# Patient Record
Sex: FEMALE | Race: WHITE | Employment: FULL TIME | URBAN - METROPOLITAN AREA
[De-identification: names, ages, dates, MRNs, and addresses within clinical notes are randomized per-mention and may not be internally consistent; named-entity substitution may affect disease eponyms.]

---

## 2017-03-13 ENCOUNTER — OFFICE VISIT (OUTPATIENT)
Dept: FAMILY MEDICINE CLINIC | Facility: CLINIC | Age: 62
End: 2017-03-13

## 2017-03-13 VITALS
BODY MASS INDEX: 29.07 KG/M2 | DIASTOLIC BLOOD PRESSURE: 68 MMHG | TEMPERATURE: 98 F | WEIGHT: 194 LBS | OXYGEN SATURATION: 98 % | HEIGHT: 68.5 IN | HEART RATE: 78 BPM | RESPIRATION RATE: 20 BRPM | SYSTOLIC BLOOD PRESSURE: 118 MMHG

## 2017-03-13 DIAGNOSIS — Z00.00 LABORATORY EXAMINATION ORDERED AS PART OF A ROUTINE GENERAL MEDICAL EXAMINATION: Primary | ICD-10-CM

## 2017-03-13 DIAGNOSIS — Z12.4 SCREENING FOR CERVICAL CANCER: ICD-10-CM

## 2017-03-13 DIAGNOSIS — Z01.419 ENCOUNTER FOR WELL WOMAN EXAM WITH ROUTINE GYNECOLOGICAL EXAM: ICD-10-CM

## 2017-03-13 PROCEDURE — 88175 CYTOPATH C/V AUTO FLUID REDO: CPT | Performed by: FAMILY MEDICINE

## 2017-03-13 PROCEDURE — 87624 HPV HI-RISK TYP POOLED RSLT: CPT | Performed by: FAMILY MEDICINE

## 2017-03-13 PROCEDURE — 99396 PREV VISIT EST AGE 40-64: CPT | Performed by: FAMILY MEDICINE

## 2017-03-13 NOTE — PROGRESS NOTES
Here for well woman exam with breast exam and Pap. Has no complaints. Has been nursing her  to health after a very complicated hip replacement. Otherwise review of systems is unremarkable. She is heading back to the exercise gym.   She is on no o

## 2017-03-14 ENCOUNTER — TELEPHONE (OUTPATIENT)
Dept: FAMILY MEDICINE CLINIC | Facility: CLINIC | Age: 62
End: 2017-03-14

## 2017-03-14 NOTE — TELEPHONE ENCOUNTER
Pt states she is looking for her meds from yesterdays visit to go to [address] They do not have them yet. Can we resend them to the pharmacy again? Call her back if any problems. She was seen yesterday.

## 2017-03-14 NOTE — TELEPHONE ENCOUNTER
Pt states she is looking for her meds from yesterdays visit to go to [place] [address]. They do not have them yet. Can we resend them to the pharmacy again? Do you want Estrace cream .01%, 1gm topically 2-3 times a week?

## 2017-03-16 LAB — HPV I/H RISK 1 DNA SPEC QL NAA+PROBE: NEGATIVE

## 2017-03-17 ENCOUNTER — LAB ENCOUNTER (OUTPATIENT)
Dept: LAB | Age: 62
End: 2017-03-17
Attending: FAMILY MEDICINE
Payer: COMMERCIAL

## 2017-03-17 DIAGNOSIS — Z00.00 LABORATORY EXAMINATION ORDERED AS PART OF A ROUTINE GENERAL MEDICAL EXAMINATION: ICD-10-CM

## 2017-03-17 LAB
25-HYDROXYVITAMIN D (TOTAL): 44.5 NG/ML (ref 30–100)
ALBUMIN SERPL-MCNC: 3.5 G/DL (ref 3.5–4.8)
ALP LIVER SERPL-CCNC: 98 U/L (ref 50–130)
ALT SERPL-CCNC: 25 U/L (ref 14–54)
AST SERPL-CCNC: 17 U/L (ref 15–41)
BASOPHILS # BLD AUTO: 0.04 X10(3) UL (ref 0–0.1)
BASOPHILS NFR BLD AUTO: 0.6 %
BILIRUB SERPL-MCNC: 0.5 MG/DL (ref 0.1–2)
BUN BLD-MCNC: 16 MG/DL (ref 8–20)
CALCIUM BLD-MCNC: 9.5 MG/DL (ref 8.3–10.3)
CHLORIDE: 109 MMOL/L (ref 101–111)
CHOLEST SMN-MCNC: 199 MG/DL (ref ?–200)
CO2: 27 MMOL/L (ref 22–32)
CREAT BLD-MCNC: 0.84 MG/DL (ref 0.55–1.02)
EOSINOPHIL # BLD AUTO: 0.12 X10(3) UL (ref 0–0.3)
EOSINOPHIL NFR BLD AUTO: 1.9 %
ERYTHROCYTE [DISTWIDTH] IN BLOOD BY AUTOMATED COUNT: 13.3 % (ref 11.5–16)
GLUCOSE BLD-MCNC: 84 MG/DL (ref 70–99)
HCT VFR BLD AUTO: 40.6 % (ref 34–50)
HDLC SERPL-MCNC: 92 MG/DL (ref 45–?)
HDLC SERPL: 2.16 {RATIO} (ref ?–4.44)
HGB BLD-MCNC: 13.4 G/DL (ref 12–16)
IMMATURE GRANULOCYTE COUNT: 0.01 X10(3) UL (ref 0–1)
IMMATURE GRANULOCYTE RATIO %: 0.2 %
LDLC SERPL CALC-MCNC: 98 MG/DL (ref ?–130)
LYMPHOCYTES # BLD AUTO: 2.94 X10(3) UL (ref 0.9–4)
LYMPHOCYTES NFR BLD AUTO: 47 %
M PROTEIN MFR SERPL ELPH: 6.8 G/DL (ref 6.1–8.3)
MCH RBC QN AUTO: 33 PG (ref 27–33.2)
MCHC RBC AUTO-ENTMCNC: 33 G/DL (ref 31–37)
MCV RBC AUTO: 100 FL (ref 81–100)
MONOCYTES # BLD AUTO: 0.56 X10(3) UL (ref 0.1–0.6)
MONOCYTES NFR BLD AUTO: 9 %
NEUTROPHIL ABS PRELIM: 2.58 X10 (3) UL (ref 1.3–6.7)
NEUTROPHILS # BLD AUTO: 2.58 X10(3) UL (ref 1.3–6.7)
NEUTROPHILS NFR BLD AUTO: 41.3 %
NONHDLC SERPL-MCNC: 107 MG/DL (ref ?–130)
PLATELET # BLD AUTO: 259 10(3)UL (ref 150–450)
POTASSIUM SERPL-SCNC: 4.1 MMOL/L (ref 3.6–5.1)
RBC # BLD AUTO: 4.06 X10(6)UL (ref 3.8–5.1)
RED CELL DISTRIBUTION WIDTH-SD: 49.3 FL (ref 35.1–46.3)
SODIUM SERPL-SCNC: 143 MMOL/L (ref 136–144)
TRIGLYCERIDES: 46 MG/DL (ref ?–150)
TSI SER-ACNC: 6.2 MIU/ML (ref 0.35–5.5)
VLDL: 9 MG/DL (ref 5–40)
WBC # BLD AUTO: 6.3 X10(3) UL (ref 4–13)

## 2017-03-17 PROCEDURE — 80053 COMPREHEN METABOLIC PANEL: CPT

## 2017-03-17 PROCEDURE — 80061 LIPID PANEL: CPT

## 2017-03-17 PROCEDURE — 36415 COLL VENOUS BLD VENIPUNCTURE: CPT

## 2017-03-17 PROCEDURE — 84443 ASSAY THYROID STIM HORMONE: CPT

## 2017-03-17 PROCEDURE — 82306 VITAMIN D 25 HYDROXY: CPT

## 2017-03-17 PROCEDURE — 85025 COMPLETE CBC W/AUTO DIFF WBC: CPT

## 2017-03-17 RX ORDER — ESTRADIOL 0.1 MG/G
CREAM VAGINAL
Qty: 42.5 G | Refills: 0 | Status: SHIPPED | OUTPATIENT
Start: 2017-03-17 | End: 2018-10-09

## 2017-03-20 DIAGNOSIS — R79.89 ELEVATED TSH: Primary | ICD-10-CM

## 2017-03-22 ENCOUNTER — TELEPHONE (OUTPATIENT)
Dept: FAMILY MEDICINE CLINIC | Facility: CLINIC | Age: 62
End: 2017-03-22

## 2017-07-26 ENCOUNTER — APPOINTMENT (OUTPATIENT)
Dept: LAB | Age: 62
End: 2017-07-26
Attending: FAMILY MEDICINE
Payer: COMMERCIAL

## 2017-07-26 DIAGNOSIS — R79.89 ELEVATED TSH: ICD-10-CM

## 2017-07-26 LAB
FREE T4: 0.9 NG/DL (ref 0.9–1.8)
TSI SER-ACNC: 7.4 MIU/ML (ref 0.35–5.5)

## 2017-07-26 PROCEDURE — 36415 COLL VENOUS BLD VENIPUNCTURE: CPT

## 2017-07-26 PROCEDURE — 84439 ASSAY OF FREE THYROXINE: CPT

## 2017-07-26 PROCEDURE — 84443 ASSAY THYROID STIM HORMONE: CPT

## 2017-08-29 ENCOUNTER — OFFICE VISIT (OUTPATIENT)
Dept: FAMILY MEDICINE CLINIC | Facility: CLINIC | Age: 62
End: 2017-08-29

## 2017-08-29 VITALS
DIASTOLIC BLOOD PRESSURE: 68 MMHG | HEART RATE: 66 BPM | WEIGHT: 194 LBS | BODY MASS INDEX: 29 KG/M2 | SYSTOLIC BLOOD PRESSURE: 104 MMHG | RESPIRATION RATE: 16 BRPM | TEMPERATURE: 98 F

## 2017-08-29 DIAGNOSIS — E03.8 SUBCLINICAL HYPOTHYROIDISM: Primary | ICD-10-CM

## 2017-08-29 PROCEDURE — 99213 OFFICE O/P EST LOW 20 MIN: CPT | Performed by: FAMILY MEDICINE

## 2017-08-29 RX ORDER — LEVOTHYROXINE SODIUM 0.03 MG/1
25 TABLET ORAL
Qty: 90 TABLET | Refills: 3 | Status: SHIPPED | OUTPATIENT
Start: 2017-08-29 | End: 2017-11-22 | Stop reason: DRUGHIGH

## 2017-08-29 NOTE — PROGRESS NOTES
Long-standing history of gradually increasing TSH with mild symptoms such as brittle nails and easy fatigability. Denies palpitations depression chest pain or exercise intolerance. She is a non-smoker and modest drinker.   Otherwise she is very healthy

## 2017-10-03 ENCOUNTER — APPOINTMENT (OUTPATIENT)
Dept: LAB | Age: 62
End: 2017-10-03
Attending: FAMILY MEDICINE
Payer: COMMERCIAL

## 2017-10-03 DIAGNOSIS — E03.8 SUBCLINICAL HYPOTHYROIDISM: ICD-10-CM

## 2017-10-03 PROCEDURE — 36415 COLL VENOUS BLD VENIPUNCTURE: CPT

## 2017-10-03 PROCEDURE — 84443 ASSAY THYROID STIM HORMONE: CPT

## 2017-10-04 ENCOUNTER — TELEPHONE (OUTPATIENT)
Dept: FAMILY MEDICINE CLINIC | Facility: CLINIC | Age: 62
End: 2017-10-04

## 2017-10-04 DIAGNOSIS — Z23 NEED FOR TDAP VACCINATION: Primary | ICD-10-CM

## 2017-10-09 ENCOUNTER — APPOINTMENT (OUTPATIENT)
Dept: ULTRASOUND IMAGING | Age: 62
End: 2017-10-09
Attending: PHYSICIAN ASSISTANT
Payer: COMMERCIAL

## 2017-10-09 ENCOUNTER — HOSPITAL ENCOUNTER (OUTPATIENT)
Age: 62
Discharge: HOME | End: 2017-10-09
Payer: COMMERCIAL

## 2017-10-09 ENCOUNTER — APPOINTMENT (OUTPATIENT)
Dept: GENERAL RADIOLOGY | Age: 62
End: 2017-10-09
Attending: PHYSICIAN ASSISTANT
Payer: COMMERCIAL

## 2017-10-09 VITALS
SYSTOLIC BLOOD PRESSURE: 116 MMHG | TEMPERATURE: 98 F | HEART RATE: 72 BPM | BODY MASS INDEX: 27.4 KG/M2 | OXYGEN SATURATION: 100 % | DIASTOLIC BLOOD PRESSURE: 65 MMHG | WEIGHT: 185 LBS | HEIGHT: 69 IN | RESPIRATION RATE: 20 BRPM

## 2017-10-09 DIAGNOSIS — M17.12 TRICOMPARTMENT OSTEOARTHRITIS OF LEFT KNEE: ICD-10-CM

## 2017-10-09 DIAGNOSIS — M25.562 ACUTE PAIN OF LEFT KNEE: Primary | ICD-10-CM

## 2017-10-09 PROCEDURE — 99214 OFFICE O/P EST MOD 30 MIN: CPT

## 2017-10-09 PROCEDURE — 99204 OFFICE O/P NEW MOD 45 MIN: CPT

## 2017-10-09 PROCEDURE — 73560 X-RAY EXAM OF KNEE 1 OR 2: CPT | Performed by: PHYSICIAN ASSISTANT

## 2017-10-09 PROCEDURE — 76881 US COMPL JOINT R-T W/IMG: CPT | Performed by: PHYSICIAN ASSISTANT

## 2017-10-09 RX ORDER — NAPROXEN 500 MG/1
500 TABLET ORAL 2 TIMES DAILY PRN
Qty: 30 TABLET | Refills: 0 | Status: SHIPPED | OUTPATIENT
Start: 2017-10-09 | End: 2017-10-16

## 2017-10-09 NOTE — ED PROVIDER NOTES
Patient Seen in: [facility]    History   Patient presents with:  Knee Pain    Stated Complaint: Left knee pain     HPI    Cherry Hernandez is a 78-year-old female who comes in today complaining of persistent left knee pain over the past Cardiovascular: Normal rate, regular rhythm, normal heart sounds and intact distal pulses. Pulmonary/Chest: Effort normal and breath sounds normal. No respiratory distress. She has no wheezes. She has no rales. Musculoskeletal:        Left knee:  She Kelli Lowery MD on 10/09/2017 at 10:50     Approved by: Ines Mahoney MD            Us Knee Left Complete (HMS=63014)    Result Date: 10/9/2017  PROCEDURE:  US KNEE LEFT COMPLETE (CPT=76881)  COMPARISON:  SARAH LYONS KNEE (1 OR 2 VIEWS), LEFT (CPT=73560) knee      Medications Prescribed:  Current Discharge Medication List    START taking these medications    naproxen 500 MG Oral Tab  Take 1 tablet (500 mg total) by mouth 2 (two) times daily as needed.   Qty: 30 tablet Refills: 0           I have given the p Authored by Resident/PA/NP

## 2017-10-09 NOTE — ED INITIAL ASSESSMENT (HPI)
Left knee pain- pain x 1 month, yesterday she states she twisted her knee. C/o pain on the back of knee. Pain constant .  Took aleve at 6 am today

## 2017-10-10 ENCOUNTER — TELEPHONE (OUTPATIENT)
Dept: FAMILY MEDICINE CLINIC | Facility: CLINIC | Age: 62
End: 2017-10-10

## 2017-10-10 ENCOUNTER — NURSE ONLY (OUTPATIENT)
Dept: FAMILY MEDICINE CLINIC | Facility: CLINIC | Age: 62
End: 2017-10-10

## 2017-10-10 ENCOUNTER — MED REC SCAN ONLY (OUTPATIENT)
Dept: FAMILY MEDICINE CLINIC | Facility: CLINIC | Age: 62
End: 2017-10-10

## 2017-10-10 DIAGNOSIS — Z23 NEED FOR VACCINATION: ICD-10-CM

## 2017-10-10 PROCEDURE — 90472 IMMUNIZATION ADMIN EACH ADD: CPT | Performed by: FAMILY MEDICINE

## 2017-10-10 PROCEDURE — 90471 IMMUNIZATION ADMIN: CPT | Performed by: FAMILY MEDICINE

## 2017-10-10 PROCEDURE — 90715 TDAP VACCINE 7 YRS/> IM: CPT | Performed by: FAMILY MEDICINE

## 2017-10-10 PROCEDURE — 90686 IIV4 VACC NO PRSV 0.5 ML IM: CPT | Performed by: FAMILY MEDICINE

## 2017-10-10 NOTE — TELEPHONE ENCOUNTER
Patient stopped at , she dropped off copies of her urgent care report. She is being referred to ortho for her knee injury and she would prefer the recommendation come from Dr Lela Lawson of which ortho he would prefer.     Please call    Forms in tr

## 2017-11-22 PROBLEM — M89.9 LYTIC BONE LESION OF LEFT FEMUR: Status: ACTIVE | Noted: 2017-11-22

## 2017-11-22 PROBLEM — M89.8X5 LYTIC BONE LESION OF LEFT FEMUR: Status: ACTIVE | Noted: 2017-11-22

## 2017-11-22 PROBLEM — M17.0 PRIMARY OSTEOARTHRITIS OF BOTH KNEES: Status: ACTIVE | Noted: 2017-11-22

## 2017-11-29 ENCOUNTER — HOSPITAL ENCOUNTER (OUTPATIENT)
Dept: MRI IMAGING | Age: 62
Discharge: HOME | End: 2017-11-29
Attending: ORTHOPAEDIC SURGERY
Payer: COMMERCIAL

## 2017-11-29 DIAGNOSIS — M17.0 PRIMARY OSTEOARTHRITIS OF KNEES, BILATERAL: ICD-10-CM

## 2017-11-29 DIAGNOSIS — M89.9 LYTIC BONE LESION OF LEFT FEMUR: ICD-10-CM

## 2017-11-29 PROCEDURE — 73721 MRI JNT OF LWR EXTRE W/O DYE: CPT | Performed by: ORTHOPAEDIC SURGERY

## 2018-01-02 ENCOUNTER — APPOINTMENT (OUTPATIENT)
Dept: LAB | Age: 63
End: 2018-01-02
Attending: FAMILY MEDICINE
Payer: COMMERCIAL

## 2018-01-02 DIAGNOSIS — E03.8 SUBCLINICAL HYPOTHYROIDISM: ICD-10-CM

## 2018-01-02 LAB — TSI SER-ACNC: 4.29 MIU/ML (ref 0.35–5.5)

## 2018-01-02 PROCEDURE — 84443 ASSAY THYROID STIM HORMONE: CPT

## 2018-01-02 PROCEDURE — 36415 COLL VENOUS BLD VENIPUNCTURE: CPT

## 2018-01-05 ENCOUNTER — TELEPHONE (OUTPATIENT)
Dept: FAMILY MEDICINE CLINIC | Facility: CLINIC | Age: 63
End: 2018-01-05

## 2018-01-05 NOTE — TELEPHONE ENCOUNTER
Pt called back returning a call for the nurse to discuss her test results. Please call pt and advise.

## 2018-01-08 DIAGNOSIS — E03.8 SUBCLINICAL HYPOTHYROIDISM: ICD-10-CM

## 2018-01-09 RX ORDER — LEVOTHYROXINE SODIUM 0.05 MG/1
TABLET ORAL
Qty: 90 TABLET | Refills: 0 | Status: SHIPPED | OUTPATIENT
Start: 2018-01-09 | End: 2018-04-02

## 2018-03-02 ENCOUNTER — TELEPHONE (OUTPATIENT)
Dept: FAMILY MEDICINE CLINIC | Facility: CLINIC | Age: 63
End: 2018-03-02

## 2018-03-02 DIAGNOSIS — M89.8X5 OTHER SPECIFIED DISORDERS OF BONE, THIGH: Primary | ICD-10-CM

## 2018-03-14 PROBLEM — S83.232A COMPLEX TEAR OF MEDIAL MENISCUS OF LEFT KNEE AS CURRENT INJURY, INITIAL ENCOUNTER: Status: ACTIVE | Noted: 2018-03-14

## 2018-03-14 PROBLEM — M17.12 PRIMARY OSTEOARTHRITIS OF LEFT KNEE: Status: ACTIVE | Noted: 2018-03-14

## 2018-04-02 DIAGNOSIS — E03.8 SUBCLINICAL HYPOTHYROIDISM: ICD-10-CM

## 2018-04-02 RX ORDER — LEVOTHYROXINE SODIUM 0.05 MG/1
TABLET ORAL
Qty: 90 TABLET | Refills: 0 | Status: SHIPPED | OUTPATIENT
Start: 2018-04-02 | End: 2018-06-21

## 2018-04-19 ENCOUNTER — PATIENT MESSAGE (OUTPATIENT)
Dept: FAMILY MEDICINE CLINIC | Facility: CLINIC | Age: 63
End: 2018-04-19

## 2018-04-19 DIAGNOSIS — D58.2 HEMOGLOBIN DISORDER (HCC): Primary | ICD-10-CM

## 2018-04-19 NOTE — TELEPHONE ENCOUNTER
From: Rosy Jaramillo  To: Елена Dumont DO  Sent: 4/19/2018 12:26 PM CDT  Subject: Non-Urgent Medical Question    Thanks - we will meet. Does it really require Dr. Fiona Saba (oncologist) in that it is FACTOR 2 (not factor 5).     Can't we just get regul

## 2018-04-20 NOTE — TELEPHONE ENCOUNTER
The serologic studies usually go beyond routine testing.  That' s where lily comes       Or    Can forward your other daughter's test results to me and I'll run it by lily, saving you  An appointment  umang

## 2018-05-17 NOTE — TELEPHONE ENCOUNTER
Per patient, patient would like ot know how to get testing to find out if she is the carrier of the prothombin mutation her daughter has. Per Dr. Lionel Cintron, pt to see Dr. Gaby Chin to discuss. Referral placed.

## 2018-06-16 ENCOUNTER — OFFICE VISIT (OUTPATIENT)
Dept: FAMILY MEDICINE CLINIC | Facility: CLINIC | Age: 63
End: 2018-06-16

## 2018-06-16 VITALS
HEART RATE: 68 BPM | TEMPERATURE: 98 F | WEIGHT: 196 LBS | BODY MASS INDEX: 29.03 KG/M2 | RESPIRATION RATE: 16 BRPM | DIASTOLIC BLOOD PRESSURE: 78 MMHG | HEIGHT: 69 IN | OXYGEN SATURATION: 98 % | SYSTOLIC BLOOD PRESSURE: 112 MMHG

## 2018-06-16 DIAGNOSIS — J01.00 SUBACUTE MAXILLARY SINUSITIS: Primary | ICD-10-CM

## 2018-06-16 PROCEDURE — 99213 OFFICE O/P EST LOW 20 MIN: CPT | Performed by: FAMILY MEDICINE

## 2018-06-16 RX ORDER — CEFDINIR 300 MG/1
300 CAPSULE ORAL 2 TIMES DAILY
Qty: 20 CAPSULE | Refills: 0 | Status: SHIPPED | OUTPATIENT
Start: 2018-06-16 | End: 2018-06-26

## 2018-06-16 NOTE — PROGRESS NOTES
Pt here with cold symptoms.     Started 4 days ago   Getting worse   OTC meds - zyrtec   +  cough and congestion  + sinus tendernss  No  ear pain  No  throat pain  No  fever and chills  +  sick contacts    No h/o asthma  No h/o tobacco abuse / h/o second ha

## 2018-06-20 ENCOUNTER — HOSPITAL ENCOUNTER (OUTPATIENT)
Dept: ULTRASOUND IMAGING | Age: 63
Discharge: HOME | End: 2018-06-20
Attending: FAMILY MEDICINE
Payer: COMMERCIAL

## 2018-06-20 ENCOUNTER — APPOINTMENT (OUTPATIENT)
Dept: LAB | Age: 63
End: 2018-06-20
Attending: FAMILY MEDICINE
Payer: COMMERCIAL

## 2018-06-20 DIAGNOSIS — E03.8 SUBCLINICAL HYPOTHYROIDISM: ICD-10-CM

## 2018-06-20 PROCEDURE — 36415 COLL VENOUS BLD VENIPUNCTURE: CPT

## 2018-06-20 PROCEDURE — 76536 US EXAM OF HEAD AND NECK: CPT | Performed by: FAMILY MEDICINE

## 2018-06-20 PROCEDURE — 84443 ASSAY THYROID STIM HORMONE: CPT

## 2018-06-21 ENCOUNTER — TELEPHONE (OUTPATIENT)
Dept: FAMILY MEDICINE CLINIC | Facility: CLINIC | Age: 63
End: 2018-06-21

## 2018-06-21 DIAGNOSIS — E04.2 MULTIPLE THYROID NODULES: Primary | ICD-10-CM

## 2018-06-21 DIAGNOSIS — E03.8 SUBCLINICAL HYPOTHYROIDISM: ICD-10-CM

## 2018-06-21 RX ORDER — LEVOTHYROXINE SODIUM 0.05 MG/1
TABLET ORAL
Qty: 90 TABLET | Refills: 0 | Status: SHIPPED | OUTPATIENT
Start: 2018-06-21 | End: 2018-10-04

## 2018-06-21 NOTE — TELEPHONE ENCOUNTER
----- Message from Melisa Rosado DO sent at 6/20/2018  9:05 PM CDT -----  Have you seen an ENT consultant yet ?     Repeat thyroid ultrasound in 6 months

## 2018-06-26 ENCOUNTER — TELEPHONE (OUTPATIENT)
Dept: FAMILY MEDICINE CLINIC | Facility: CLINIC | Age: 63
End: 2018-06-26

## 2018-07-26 ENCOUNTER — OFFICE VISIT (OUTPATIENT)
Dept: HEMATOLOGY/ONCOLOGY | Facility: HOSPITAL | Age: 63
End: 2018-07-26
Attending: INTERNAL MEDICINE
Payer: COMMERCIAL

## 2018-07-26 VITALS
DIASTOLIC BLOOD PRESSURE: 79 MMHG | HEIGHT: 69.02 IN | OXYGEN SATURATION: 98 % | BODY MASS INDEX: 29.23 KG/M2 | SYSTOLIC BLOOD PRESSURE: 128 MMHG | HEART RATE: 73 BPM | WEIGHT: 197.38 LBS | RESPIRATION RATE: 18 BRPM | TEMPERATURE: 98 F

## 2018-07-26 DIAGNOSIS — Z83.2 FAMILY HISTORY OF PROTHROMBIN GENE MUTATION: Primary | ICD-10-CM

## 2018-07-26 PROCEDURE — 99244 OFF/OP CNSLTJ NEW/EST MOD 40: CPT | Performed by: INTERNAL MEDICINE

## 2018-07-26 NOTE — CONSULTS
Cancer Center Report of Consultation    Patient Name: Yeny Minus   YOB: 1955   Medical Record Number: [id number]   CSN: [id number]   Consulting Physician: Zakia Hines MD  Referring Physician(s): Nestor Gonzalez  Date of Consultation: Psychosocial History:    Social History  Social History   Marital status:   Spouse name: N/A    Years of education: N/A  Number of children: 4     Occupational History  None on file     Social History Main Topics   Smoking status: Never Smoker S1S2 normal.  Abdomen: Soft, non tender with good bowel sounds. No hepatosplenomegaly. No palpable mass. Extremities: No edema or calf tenderness. Neurological: Grossly intact.        Lab Results  Component Value Date   WBC 6.3 03/17/2017   RBC 4.06 03/

## 2018-07-31 ENCOUNTER — OFFICE VISIT (OUTPATIENT)
Dept: FAMILY MEDICINE CLINIC | Facility: CLINIC | Age: 63
End: 2018-07-31
Payer: COMMERCIAL

## 2018-07-31 VITALS
HEART RATE: 64 BPM | WEIGHT: 199 LBS | HEIGHT: 69 IN | BODY MASS INDEX: 29.47 KG/M2 | DIASTOLIC BLOOD PRESSURE: 72 MMHG | SYSTOLIC BLOOD PRESSURE: 112 MMHG | TEMPERATURE: 99 F | RESPIRATION RATE: 16 BRPM | OXYGEN SATURATION: 98 %

## 2018-07-31 DIAGNOSIS — D22.9 NEVUS: Primary | ICD-10-CM

## 2018-07-31 PROCEDURE — 99213 OFFICE O/P EST LOW 20 MIN: CPT | Performed by: FAMILY MEDICINE

## 2018-07-31 NOTE — PROGRESS NOTES
Presents today for evaluation of a 3 mm tan colored well-demarcated raised nevus on the helix of her right ear she does have a history of basal cell cancer with no recurrence and has done quite well. She is of Celtic origin.   No local adenopathy is noted

## 2018-10-04 DIAGNOSIS — E03.8 SUBCLINICAL HYPOTHYROIDISM: ICD-10-CM

## 2018-10-04 RX ORDER — LEVOTHYROXINE SODIUM 0.05 MG/1
TABLET ORAL
Qty: 90 TABLET | Refills: 0 | Status: SHIPPED | OUTPATIENT
Start: 2018-10-04 | End: 2019-01-02

## 2018-10-09 ENCOUNTER — OFFICE VISIT (OUTPATIENT)
Dept: FAMILY MEDICINE CLINIC | Facility: CLINIC | Age: 63
End: 2018-10-09
Payer: COMMERCIAL

## 2018-10-09 ENCOUNTER — HOSPITAL ENCOUNTER (OUTPATIENT)
Dept: GENERAL RADIOLOGY | Age: 63
Discharge: HOME | End: 2018-10-09
Attending: PHYSICIAN ASSISTANT
Payer: COMMERCIAL

## 2018-10-09 VITALS
BODY MASS INDEX: 29.21 KG/M2 | TEMPERATURE: 99 F | SYSTOLIC BLOOD PRESSURE: 132 MMHG | HEART RATE: 72 BPM | DIASTOLIC BLOOD PRESSURE: 70 MMHG | RESPIRATION RATE: 16 BRPM | HEIGHT: 69 IN | WEIGHT: 197.25 LBS

## 2018-10-09 DIAGNOSIS — M79.672 LEFT FOOT PAIN: ICD-10-CM

## 2018-10-09 DIAGNOSIS — N89.8 VAGINAL DRYNESS: ICD-10-CM

## 2018-10-09 DIAGNOSIS — M79.672 LEFT FOOT PAIN: Primary | ICD-10-CM

## 2018-10-09 PROCEDURE — 73630 X-RAY EXAM OF FOOT: CPT | Performed by: PHYSICIAN ASSISTANT

## 2018-10-09 PROCEDURE — 99213 OFFICE O/P EST LOW 20 MIN: CPT | Performed by: PHYSICIAN ASSISTANT

## 2018-10-09 RX ORDER — ESTRADIOL 0.1 MG/G
CREAM VAGINAL
Qty: 42.5 G | Refills: 0 | Status: SHIPPED | OUTPATIENT
Start: 2018-10-09 | End: 2019-02-01

## 2018-10-09 NOTE — PROGRESS NOTES
HPI:   Mitra Bach is a 61year old female who presents for a foot injury. On Sept 30, she walked out in the back yard. Was wearing flip flops and rolled her ankle walking on the uneven grass. Unsure exactly what happened. Had pain later that night.  + shortness of breath, chest heaviness or cough  CV: denies chest pain, pressure or palpitations  : denies dysuria, vaginal discharge or itching; + vaginal dryness, denies dyspareunia   MS: denies back pain, +left foot pain  NEURO: denies numbness or tingl

## 2018-10-16 ENCOUNTER — CHARTING TRANS (OUTPATIENT)
Dept: OTHER | Age: 63
End: 2018-10-16

## 2018-10-25 ENCOUNTER — OFFICE VISIT (OUTPATIENT)
Dept: FAMILY MEDICINE CLINIC | Facility: CLINIC | Age: 63
End: 2018-10-25
Payer: COMMERCIAL

## 2018-10-25 VITALS
WEIGHT: 197 LBS | RESPIRATION RATE: 18 BRPM | BODY MASS INDEX: 29.18 KG/M2 | DIASTOLIC BLOOD PRESSURE: 70 MMHG | OXYGEN SATURATION: 98 % | HEIGHT: 69 IN | SYSTOLIC BLOOD PRESSURE: 118 MMHG | HEART RATE: 77 BPM

## 2018-10-25 DIAGNOSIS — Z12.31 ENCOUNTER FOR SCREENING MAMMOGRAM FOR MALIGNANT NEOPLASM OF BREAST: Primary | ICD-10-CM

## 2018-10-25 DIAGNOSIS — M77.50 TENDINITIS OF ANKLE OR FOOT: Primary | ICD-10-CM

## 2018-10-25 PROCEDURE — 99213 OFFICE O/P EST LOW 20 MIN: CPT | Performed by: FAMILY MEDICINE

## 2018-10-25 RX ORDER — PREDNISONE 20 MG/1
20 TABLET ORAL 2 TIMES DAILY
Qty: 20 TABLET | Refills: 0 | Status: SHIPPED | OUTPATIENT
Start: 2018-10-25 | End: 2018-11-04

## 2018-10-25 NOTE — PROGRESS NOTES
Last week, one day after playing with some children, developed some severe pain in the forefoot of her left foot. She has no history of gout or other arthritis. She had no other systemic complaints.     She was subsequently seen in our office by our nurse

## 2019-01-02 DIAGNOSIS — E03.8 SUBCLINICAL HYPOTHYROIDISM: ICD-10-CM

## 2019-01-02 RX ORDER — LEVOTHYROXINE SODIUM 0.05 MG/1
TABLET ORAL
Qty: 90 TABLET | Refills: 3 | Status: SHIPPED | OUTPATIENT
Start: 2019-01-02 | End: 2019-05-28

## 2019-01-21 ENCOUNTER — TELEPHONE (OUTPATIENT)
Dept: FAMILY MEDICINE CLINIC | Facility: CLINIC | Age: 64
End: 2019-01-21

## 2019-01-21 NOTE — TELEPHONE ENCOUNTER
PLS PUT LABS INTO SYSTEM FOR PATIENT AND CALL HER ONCE THEY ARE IN AS SHE WOULD LIKE TO DO THIS TOMORROW.

## 2019-01-29 ENCOUNTER — TELEPHONE (OUTPATIENT)
Dept: FAMILY MEDICINE CLINIC | Facility: CLINIC | Age: 64
End: 2019-01-29

## 2019-01-29 DIAGNOSIS — E03.8 SUBCLINICAL HYPOTHYROIDISM: Primary | ICD-10-CM

## 2019-02-01 DIAGNOSIS — N89.8 VAGINAL DRYNESS: ICD-10-CM

## 2019-02-01 RX ORDER — ESTRADIOL 0.1 MG/G
CREAM VAGINAL
Qty: 42.5 G | Refills: 0 | Status: SHIPPED | OUTPATIENT
Start: 2019-02-01 | End: 2019-03-19

## 2019-02-04 ENCOUNTER — TELEPHONE (OUTPATIENT)
Dept: FAMILY MEDICINE CLINIC | Facility: CLINIC | Age: 64
End: 2019-02-04

## 2019-02-04 NOTE — TELEPHONE ENCOUNTER
Prior Authorization received from [employer] for the Estradiol 0.01% Vag cream 42.5GM. Form placed at triage bin for fup.

## 2019-02-15 ENCOUNTER — TELEPHONE (OUTPATIENT)
Dept: FAMILY MEDICINE CLINIC | Facility: CLINIC | Age: 64
End: 2019-02-15

## 2019-03-05 ENCOUNTER — APPOINTMENT (OUTPATIENT)
Dept: LAB | Age: 64
End: 2019-03-05
Attending: OBSTETRICS & GYNECOLOGY
Payer: COMMERCIAL

## 2019-03-05 ENCOUNTER — HOSPITAL ENCOUNTER (OUTPATIENT)
Dept: MAMMOGRAPHY | Age: 64
Discharge: HOME | End: 2019-03-05
Attending: INTERNAL MEDICINE
Payer: COMMERCIAL

## 2019-03-05 ENCOUNTER — HOSPITAL ENCOUNTER (OUTPATIENT)
Dept: ULTRASOUND IMAGING | Age: 64
Discharge: HOME | End: 2019-03-05
Attending: OTOLARYNGOLOGY
Payer: COMMERCIAL

## 2019-03-05 DIAGNOSIS — Z12.31 ENCOUNTER FOR SCREENING MAMMOGRAM FOR MALIGNANT NEOPLASM OF BREAST: ICD-10-CM

## 2019-03-05 DIAGNOSIS — E03.8 SUBCLINICAL HYPOTHYROIDISM: ICD-10-CM

## 2019-03-05 DIAGNOSIS — E04.2 NONTOXIC MULTINODULAR GOITER: ICD-10-CM

## 2019-03-05 LAB — TSI SER-ACNC: 2.53 MIU/ML (ref 0.36–3.74)

## 2019-03-05 PROCEDURE — 36415 COLL VENOUS BLD VENIPUNCTURE: CPT | Performed by: FAMILY MEDICINE

## 2019-03-05 PROCEDURE — 84443 ASSAY THYROID STIM HORMONE: CPT | Performed by: FAMILY MEDICINE

## 2019-03-05 PROCEDURE — 77063 BREAST TOMOSYNTHESIS BI: CPT | Performed by: INTERNAL MEDICINE

## 2019-03-05 PROCEDURE — 77067 SCR MAMMO BI INCL CAD: CPT | Performed by: INTERNAL MEDICINE

## 2019-03-05 PROCEDURE — 76536 US EXAM OF HEAD AND NECK: CPT | Performed by: OTOLARYNGOLOGY

## 2019-03-19 DIAGNOSIS — N89.8 VAGINAL DRYNESS: ICD-10-CM

## 2019-03-19 RX ORDER — ESTRADIOL 0.1 MG/G
CREAM VAGINAL
Qty: 42.5 G | Refills: 0 | Status: SHIPPED | OUTPATIENT
Start: 2019-03-19 | End: 2019-05-28

## 2019-05-06 ENCOUNTER — TELEPHONE (OUTPATIENT)
Dept: FAMILY MEDICINE CLINIC | Facility: CLINIC | Age: 64
End: 2019-05-06

## 2019-05-28 ENCOUNTER — OFFICE VISIT (OUTPATIENT)
Dept: FAMILY MEDICINE CLINIC | Facility: CLINIC | Age: 64
End: 2019-05-28
Payer: COMMERCIAL

## 2019-05-28 VITALS
OXYGEN SATURATION: 98 % | HEIGHT: 69 IN | HEART RATE: 68 BPM | WEIGHT: 197 LBS | SYSTOLIC BLOOD PRESSURE: 118 MMHG | TEMPERATURE: 99 F | RESPIRATION RATE: 16 BRPM | DIASTOLIC BLOOD PRESSURE: 76 MMHG | BODY MASS INDEX: 29.18 KG/M2

## 2019-05-28 DIAGNOSIS — Z00.00 WELL WOMAN EXAM WITHOUT GYNECOLOGICAL EXAM: Primary | ICD-10-CM

## 2019-05-28 DIAGNOSIS — N39.3 STRESS INCONTINENCE: ICD-10-CM

## 2019-05-28 DIAGNOSIS — E55.9 HYPOVITAMINOSIS D: ICD-10-CM

## 2019-05-28 DIAGNOSIS — N89.8 VAGINAL DRYNESS: ICD-10-CM

## 2019-05-28 DIAGNOSIS — E03.8 SUBCLINICAL HYPOTHYROIDISM: ICD-10-CM

## 2019-05-28 DIAGNOSIS — Z00.00 LABORATORY EXAM ORDERED AS PART OF ROUTINE GENERAL MEDICAL EXAMINATION: ICD-10-CM

## 2019-05-28 PROCEDURE — 99396 PREV VISIT EST AGE 40-64: CPT | Performed by: FAMILY MEDICINE

## 2019-05-28 RX ORDER — ESTRADIOL 0.1 MG/G
CREAM VAGINAL
Qty: 42.5 G | Refills: 3 | Status: SHIPPED | OUTPATIENT
Start: 2019-05-28 | End: 2019-11-05

## 2019-05-28 RX ORDER — LEVOTHYROXINE SODIUM 0.05 MG/1
50 TABLET ORAL
Qty: 90 TABLET | Refills: 3 | Status: SHIPPED | OUTPATIENT
Start: 2019-05-28 | End: 2019-12-27

## 2019-05-28 NOTE — PROGRESS NOTES
Presents for wellness check.   Is up-to-date on mammography Paps and colonoscopy (polyps to be repeated in 5 years) new introduction of breast cancer to the family in one sister referred to cancer center for advice and counseling    Reviewed her recent TSH

## 2019-05-29 ENCOUNTER — TELEPHONE (OUTPATIENT)
Dept: FAMILY MEDICINE CLINIC | Facility: CLINIC | Age: 64
End: 2019-05-29

## 2019-06-04 ENCOUNTER — LAB ENCOUNTER (OUTPATIENT)
Dept: LAB | Age: 64
End: 2019-06-04
Attending: FAMILY MEDICINE
Payer: COMMERCIAL

## 2019-06-04 DIAGNOSIS — Z00.00 WELL WOMAN EXAM WITHOUT GYNECOLOGICAL EXAM: ICD-10-CM

## 2019-06-04 DIAGNOSIS — E55.9 HYPOVITAMINOSIS D: ICD-10-CM

## 2019-06-04 DIAGNOSIS — Z00.00 LABORATORY EXAM ORDERED AS PART OF ROUTINE GENERAL MEDICAL EXAMINATION: ICD-10-CM

## 2019-06-04 PROCEDURE — 36415 COLL VENOUS BLD VENIPUNCTURE: CPT | Performed by: FAMILY MEDICINE

## 2019-06-04 PROCEDURE — 80050 GENERAL HEALTH PANEL: CPT | Performed by: FAMILY MEDICINE

## 2019-06-04 PROCEDURE — 80061 LIPID PANEL: CPT | Performed by: FAMILY MEDICINE

## 2019-06-04 PROCEDURE — 82306 VITAMIN D 25 HYDROXY: CPT | Performed by: FAMILY MEDICINE

## 2019-06-21 NOTE — TELEPHONE ENCOUNTER
Received reply regarding RX    Product does not require authorization at this time, attempted to refill too soon.     Put in pa triage

## 2019-10-03 ENCOUNTER — WALK IN (OUTPATIENT)
Dept: URGENT CARE | Age: 64
End: 2019-10-03

## 2019-10-03 DIAGNOSIS — Z23 NEED FOR IMMUNIZATION AGAINST INFLUENZA: Primary | ICD-10-CM

## 2019-10-03 PROCEDURE — 90471 IMMUNIZATION ADMIN: CPT | Performed by: NURSE PRACTITIONER

## 2019-10-03 PROCEDURE — 90686 IIV4 VACC NO PRSV 0.5 ML IM: CPT | Performed by: NURSE PRACTITIONER

## 2019-11-05 DIAGNOSIS — N89.8 VAGINAL DRYNESS: ICD-10-CM

## 2019-11-05 RX ORDER — ESTRADIOL 0.1 MG/G
CREAM VAGINAL
Qty: 42.5 G | Refills: 3 | Status: SHIPPED | OUTPATIENT
Start: 2019-11-05 | End: 2020-11-30

## 2019-12-26 DIAGNOSIS — E03.8 SUBCLINICAL HYPOTHYROIDISM: ICD-10-CM

## 2019-12-27 RX ORDER — LEVOTHYROXINE SODIUM 0.05 MG/1
TABLET ORAL
Qty: 90 TABLET | Refills: 3 | Status: SHIPPED | OUTPATIENT
Start: 2019-12-27 | End: 2020-11-30

## 2020-02-03 ENCOUNTER — OFFICE VISIT (OUTPATIENT)
Dept: FAMILY MEDICINE CLINIC | Facility: CLINIC | Age: 65
End: 2020-02-03
Payer: COMMERCIAL

## 2020-02-03 VITALS
BODY MASS INDEX: 27.99 KG/M2 | WEIGHT: 189 LBS | DIASTOLIC BLOOD PRESSURE: 74 MMHG | OXYGEN SATURATION: 97 % | HEIGHT: 69 IN | TEMPERATURE: 100 F | RESPIRATION RATE: 20 BRPM | SYSTOLIC BLOOD PRESSURE: 114 MMHG | HEART RATE: 76 BPM

## 2020-02-03 DIAGNOSIS — R05.9 COUGH: Primary | ICD-10-CM

## 2020-02-03 PROCEDURE — 99213 OFFICE O/P EST LOW 20 MIN: CPT | Performed by: FAMILY MEDICINE

## 2020-02-03 RX ORDER — AZITHROMYCIN 250 MG/1
TABLET, FILM COATED ORAL
Qty: 6 TABLET | Refills: 0 | Status: SHIPPED | OUTPATIENT
Start: 2020-02-03 | End: 2020-06-25 | Stop reason: ALTCHOICE

## 2020-02-03 RX ORDER — BENZONATATE 200 MG/1
200 CAPSULE ORAL 3 TIMES DAILY PRN
Qty: 30 CAPSULE | Refills: 0 | Status: SHIPPED | OUTPATIENT
Start: 2020-02-03 | End: 2020-06-25 | Stop reason: ALTCHOICE

## 2020-02-03 NOTE — PROGRESS NOTES
HPI:    Patient ID: Louis Pham is a 59year old female. Flu   This is a new problem. Episode onset: 1 week ago. The problem occurs constantly. Progression since onset: Was improving the last couple days before worsening again yesterday.  Associated exhibits no discharge. Left eye exhibits no discharge. Neck: Normal range of motion. Neck supple. No thyromegaly present. Cardiovascular: Normal rate, regular rhythm and normal heart sounds.    Pulmonary/Chest: Effort normal and breath sounds normal. No

## 2020-02-07 ENCOUNTER — APPOINTMENT (OUTPATIENT)
Dept: LAB | Age: 65
End: 2020-02-07
Attending: FAMILY MEDICINE
Payer: COMMERCIAL

## 2020-02-07 ENCOUNTER — HOSPITAL ENCOUNTER (OUTPATIENT)
Dept: GENERAL RADIOLOGY | Age: 65
Discharge: HOME | End: 2020-02-07
Attending: FAMILY MEDICINE
Payer: COMMERCIAL

## 2020-02-07 ENCOUNTER — TELEPHONE (OUTPATIENT)
Dept: FAMILY MEDICINE CLINIC | Facility: CLINIC | Age: 65
End: 2020-02-07

## 2020-02-07 DIAGNOSIS — E03.9 HYPOTHYROIDISM, UNSPECIFIED TYPE: ICD-10-CM

## 2020-02-07 DIAGNOSIS — R06.89 DIFFICULTY BREATHING: Primary | ICD-10-CM

## 2020-02-07 DIAGNOSIS — J11.1 INFLUENZA: ICD-10-CM

## 2020-02-07 DIAGNOSIS — R06.89 DIFFICULTY BREATHING: ICD-10-CM

## 2020-02-07 LAB
T4 FREE SERPL-MCNC: 1.3 NG/DL (ref 0.8–1.7)
TSI SER-ACNC: 2.86 MIU/ML (ref 0.36–3.74)

## 2020-02-07 PROCEDURE — 71046 X-RAY EXAM CHEST 2 VIEWS: CPT | Performed by: FAMILY MEDICINE

## 2020-02-07 PROCEDURE — 36415 COLL VENOUS BLD VENIPUNCTURE: CPT | Performed by: FAMILY MEDICINE

## 2020-02-07 PROCEDURE — 84443 ASSAY THYROID STIM HORMONE: CPT | Performed by: FAMILY MEDICINE

## 2020-02-07 PROCEDURE — 84439 ASSAY OF FREE THYROXINE: CPT | Performed by: FAMILY MEDICINE

## 2020-02-07 NOTE — TELEPHONE ENCOUNTER
Spoke w/ pt still hurts to breathe wants the CXR  Also, asking if we can order thyroid labs. Last ones done in 6/2019.

## 2020-02-07 NOTE — TELEPHONE ENCOUNTER
Pt says she is supposed to have a chest-xray and thyroid lab orders in the system.   pls advise when ordered

## 2020-02-10 ENCOUNTER — TELEPHONE (OUTPATIENT)
Dept: FAMILY MEDICINE CLINIC | Facility: CLINIC | Age: 65
End: 2020-02-10

## 2020-02-10 DIAGNOSIS — E03.9 HYPOTHYROIDISM, UNSPECIFIED TYPE: Primary | ICD-10-CM

## 2020-02-10 NOTE — TELEPHONE ENCOUNTER
----- Message from Hernan Santana DO sent at 2/9/2020 12:30 PM CST -----  Chest xray is normal       jg

## 2020-03-24 ENCOUNTER — OFFICE VISIT (OUTPATIENT)
Dept: UROLOGY | Facility: HOSPITAL | Age: 65
End: 2020-03-24
Attending: OBSTETRICS & GYNECOLOGY
Payer: COMMERCIAL

## 2020-03-24 VITALS — RESPIRATION RATE: 16 BRPM | HEIGHT: 69 IN | BODY MASS INDEX: 27.99 KG/M2 | WEIGHT: 189 LBS | TEMPERATURE: 97 F

## 2020-03-24 DIAGNOSIS — N95.2 POSTMENOPAUSAL ATROPHIC VAGINITIS: ICD-10-CM

## 2020-03-24 DIAGNOSIS — N81.2 UTEROVAGINAL PROLAPSE, INCOMPLETE: ICD-10-CM

## 2020-03-24 DIAGNOSIS — N81.84 PELVIC MUSCLE WASTING: Primary | ICD-10-CM

## 2020-03-24 DIAGNOSIS — N39.41 URGE INCONTINENCE: ICD-10-CM

## 2020-03-24 DIAGNOSIS — N39.3 FEMALE STRESS INCONTINENCE: ICD-10-CM

## 2020-03-24 DIAGNOSIS — R35.1 NOCTURIA: ICD-10-CM

## 2020-03-24 LAB
BILIRUB UR QL STRIP.AUTO: NEGATIVE
CLARITY UR REFRACT.AUTO: CLEAR
COLOR UR AUTO: YELLOW
CONTROL RUN WITHIN 24 HOURS?: YES
GLUCOSE UR STRIP.AUTO-MCNC: NEGATIVE MG/DL
KETONES UR STRIP.AUTO-MCNC: NEGATIVE MG/DL
LEUKOCYTE ESTERASE UR QL STRIP.AUTO: NEGATIVE
LEUKOCYTE ESTERASE URINE: NEGATIVE
NITRITE UR QL STRIP.AUTO: NEGATIVE
NITRITE URINE: NEGATIVE
PH UR STRIP.AUTO: 5 [PH] (ref 4.5–8)
PROT UR STRIP.AUTO-MCNC: NEGATIVE MG/DL
RBC UR QL AUTO: NEGATIVE
SP GR UR STRIP.AUTO: 1.01 (ref 1–1.03)
UROBILINOGEN UR STRIP.AUTO-MCNC: <2 MG/DL

## 2020-03-24 PROCEDURE — 87086 URINE CULTURE/COLONY COUNT: CPT | Performed by: OBSTETRICS & GYNECOLOGY

## 2020-03-24 PROCEDURE — 99212 OFFICE O/P EST SF 10 MIN: CPT

## 2020-03-24 PROCEDURE — 87077 CULTURE AEROBIC IDENTIFY: CPT | Performed by: OBSTETRICS & GYNECOLOGY

## 2020-03-24 PROCEDURE — 87186 SC STD MICRODIL/AGAR DIL: CPT | Performed by: OBSTETRICS & GYNECOLOGY

## 2020-03-24 PROCEDURE — 81003 URINALYSIS AUTO W/O SCOPE: CPT | Performed by: OBSTETRICS & GYNECOLOGY

## 2020-03-24 NOTE — PATIENT INSTRUCTIONS
[facility]   [facility]    BOWEL REGIMEN    Constipation can have detrimental effects on bladder function and can worsen the symptoms of prolapse. It is important to avoid constipation.     The first step for treating co Cranberries Nuts   Grapes Onions   Beer Pickled herring   Blakely’s yeast Pineapple   Canned figs Prunes   Champagne Raisins   Cheeses (hard and soft) Rye bread   Chicken livers Saccharine   Chocolate Soy sauce   Corned beef Tonia   Brett beans Yogurt   [facility] interval.    5. If you are having difficulty with voiding often during the night, monitor your fluid intake during the evening. Caffeinated beverages such as coffee, soda pop, or chocolate can cause urinary frequency.   The volume of fluids may also have a

## 2020-03-24 NOTE — PROGRESS NOTES
Kenny Vasquez,   3/24/2020     Referred by Dr. Michael Del Real  Pt here with self    Patient presents with:  Prolapse    Bulge sx x yrs    HPI:  +SANDOVAL  +UUI  Worse at night  Nocturia x2  + prolapse sx worsening  Denies dyspareunia, some pressure with intercour (VOLTAREN) 1 % Transdermal Gel, Apply 2 g topically 4 (four) times daily. , Disp: 1 g, Rfl: 3    No facility-administered medications prior to visit.        Urogynecology Summary:  Urogynecology Summary  Prolapse: Yes  SANDOVAL: Yes  Urge Incontinence: Yes  Noctu estrogen therapy for treating UGA  Bowel routine/constipation regimen  Surgical Discussion: We discussed the risks, benefits, goals and alternatives to surgical approaches for pelvic floor disorders including, but not limited to, bleeding, infection, pelvi

## 2020-03-27 ENCOUNTER — TELEPHONE (OUTPATIENT)
Dept: UROLOGY | Facility: HOSPITAL | Age: 65
End: 2020-03-27

## 2020-03-27 RX ORDER — NITROFURANTOIN 25; 75 MG/1; MG/1
100 CAPSULE ORAL 2 TIMES DAILY
Qty: 14 CAPSULE | Refills: 0 | Status: SHIPPED | OUTPATIENT
Start: 2020-03-27 | End: 2020-06-25 | Stop reason: ALTCHOICE

## 2020-03-27 NOTE — TELEPHONE ENCOUNTER
reviewed urine cx results, TORB Macrobid 100mg PO BID x7d. Called pt and notified of culture results and new orders. Pt reports she is noticing cloudy urine. Pt verbalizes understanding, will call if sx persist or worsen. Answered all questions.

## 2020-06-25 ENCOUNTER — OFFICE VISIT (OUTPATIENT)
Dept: FAMILY MEDICINE CLINIC | Facility: CLINIC | Age: 65
End: 2020-06-25
Payer: COMMERCIAL

## 2020-06-25 ENCOUNTER — HOSPITAL ENCOUNTER (OUTPATIENT)
Dept: GENERAL RADIOLOGY | Age: 65
Discharge: HOME | End: 2020-06-25
Attending: INTERNAL MEDICINE
Payer: COMMERCIAL

## 2020-06-25 VITALS
BODY MASS INDEX: 28.14 KG/M2 | OXYGEN SATURATION: 98 % | HEIGHT: 69 IN | HEART RATE: 68 BPM | DIASTOLIC BLOOD PRESSURE: 76 MMHG | RESPIRATION RATE: 16 BRPM | SYSTOLIC BLOOD PRESSURE: 118 MMHG | WEIGHT: 190 LBS

## 2020-06-25 DIAGNOSIS — Z00.00 ROUTINE GENERAL MEDICAL EXAMINATION AT A HEALTH CARE FACILITY: ICD-10-CM

## 2020-06-25 DIAGNOSIS — M25.552 LEFT HIP PAIN: Primary | ICD-10-CM

## 2020-06-25 DIAGNOSIS — M25.552 LEFT HIP PAIN: ICD-10-CM

## 2020-06-25 DIAGNOSIS — E03.9 ACQUIRED HYPOTHYROIDISM: ICD-10-CM

## 2020-06-25 DIAGNOSIS — Z12.31 ENCOUNTER FOR SCREENING MAMMOGRAM FOR BREAST CANCER: ICD-10-CM

## 2020-06-25 PROCEDURE — 99214 OFFICE O/P EST MOD 30 MIN: CPT | Performed by: INTERNAL MEDICINE

## 2020-06-25 PROCEDURE — 73502 X-RAY EXAM HIP UNI 2-3 VIEWS: CPT | Performed by: INTERNAL MEDICINE

## 2020-06-25 NOTE — PROGRESS NOTES
Louis Pham is a 59year old female. HPI:   Originally here on the wrong day for Dr Mustapha Paul, placed on my schedule due to her acute pain. History of over a year of left hip pain, seems to be getting worse. No injury.   Left hip pain that radiates t 28.06 kg/m²   GENERAL: pleasant female in mild distress due to hip pain  SKIN: warm & dry  HEENT: atraumatic, normocephalic   NECK: supple,no adenopathy   LUNGS:  easy breathing, no cough, talking without dypsnea  MS: no lumbar pain with palpation; negativ

## 2020-06-26 ENCOUNTER — LABORATORY ENCOUNTER (OUTPATIENT)
Dept: LAB | Age: 65
End: 2020-06-26
Attending: INTERNAL MEDICINE
Payer: COMMERCIAL

## 2020-06-26 DIAGNOSIS — M16.12 PRIMARY OSTEOARTHRITIS OF LEFT HIP: Primary | ICD-10-CM

## 2020-06-26 DIAGNOSIS — Z00.00 ROUTINE GENERAL MEDICAL EXAMINATION AT A HEALTH CARE FACILITY: ICD-10-CM

## 2020-06-26 DIAGNOSIS — E03.9 ACQUIRED HYPOTHYROIDISM: ICD-10-CM

## 2020-06-26 PROCEDURE — 80061 LIPID PANEL: CPT | Performed by: INTERNAL MEDICINE

## 2020-06-26 PROCEDURE — 80050 GENERAL HEALTH PANEL: CPT | Performed by: INTERNAL MEDICINE

## 2020-06-26 PROCEDURE — 82306 VITAMIN D 25 HYDROXY: CPT | Performed by: INTERNAL MEDICINE

## 2020-06-26 PROCEDURE — 36415 COLL VENOUS BLD VENIPUNCTURE: CPT | Performed by: INTERNAL MEDICINE

## 2020-06-26 PROCEDURE — 84439 ASSAY OF FREE THYROXINE: CPT | Performed by: INTERNAL MEDICINE

## 2020-08-04 ENCOUNTER — OFFICE VISIT (OUTPATIENT)
Dept: FAMILY MEDICINE CLINIC | Facility: CLINIC | Age: 65
End: 2020-08-04
Payer: COMMERCIAL

## 2020-08-04 VITALS
WEIGHT: 191 LBS | TEMPERATURE: 98 F | RESPIRATION RATE: 16 BRPM | HEIGHT: 69 IN | OXYGEN SATURATION: 98 % | DIASTOLIC BLOOD PRESSURE: 78 MMHG | HEART RATE: 65 BPM | SYSTOLIC BLOOD PRESSURE: 107 MMHG | BODY MASS INDEX: 28.29 KG/M2

## 2020-08-04 DIAGNOSIS — G57.12 MERALGIA PARESTHETICA OF LEFT SIDE: Primary | ICD-10-CM

## 2020-08-04 PROCEDURE — 3078F DIAST BP <80 MM HG: CPT | Performed by: FAMILY MEDICINE

## 2020-08-04 PROCEDURE — 3008F BODY MASS INDEX DOCD: CPT | Performed by: FAMILY MEDICINE

## 2020-08-04 PROCEDURE — 3074F SYST BP LT 130 MM HG: CPT | Performed by: FAMILY MEDICINE

## 2020-08-04 PROCEDURE — 99213 OFFICE O/P EST LOW 20 MIN: CPT | Performed by: FAMILY MEDICINE

## 2020-08-04 RX ORDER — GABAPENTIN 100 MG/1
100 CAPSULE ORAL 3 TIMES DAILY
Qty: 90 CAPSULE | Refills: 0 | Status: SHIPPED | OUTPATIENT
Start: 2020-08-04 | End: 2020-11-30

## 2020-08-04 NOTE — PROGRESS NOTES
Presents with severe pain beginning toward the left upper outer lateral gluteus region radiating over the anterior lateral thigh. She denies significant trauma chills fever.   She was seen by Martine Waddell our nurse practitioner here in an x-ray of the hip was

## 2020-09-11 ENCOUNTER — TELEPHONE (OUTPATIENT)
Dept: FAMILY MEDICINE CLINIC | Facility: CLINIC | Age: 65
End: 2020-09-11

## 2020-09-11 DIAGNOSIS — Z23 NEED FOR SHINGLES VACCINE: Primary | ICD-10-CM

## 2020-09-11 NOTE — TELEPHONE ENCOUNTER
Pt called to schedule flu shot     She wants to know if she needs a shingle or pneumonia vaccine    Please advise

## 2020-09-11 NOTE — TELEPHONE ENCOUNTER
Pt has had both prevnar 13 and pneumovax 23  Pt has had zostavax,  Do you want her to have the shingrix vaccine?

## 2020-09-14 ENCOUNTER — NURSE ONLY (OUTPATIENT)
Dept: FAMILY MEDICINE CLINIC | Facility: CLINIC | Age: 65
End: 2020-09-14
Payer: COMMERCIAL

## 2020-09-14 PROCEDURE — 90750 HZV VACC RECOMBINANT IM: CPT | Performed by: PHYSICIAN ASSISTANT

## 2020-09-14 PROCEDURE — 90662 IIV NO PRSV INCREASED AG IM: CPT | Performed by: FAMILY MEDICINE

## 2020-09-14 PROCEDURE — 90471 IMMUNIZATION ADMIN: CPT | Performed by: PHYSICIAN ASSISTANT

## 2020-09-14 PROCEDURE — 90472 IMMUNIZATION ADMIN EACH ADD: CPT | Performed by: PHYSICIAN ASSISTANT

## 2020-11-18 ENCOUNTER — HOSPITAL ENCOUNTER (OUTPATIENT)
Dept: MAMMOGRAPHY | Age: 65
Discharge: HOME | End: 2020-11-18
Attending: INTERNAL MEDICINE
Payer: COMMERCIAL

## 2020-11-18 DIAGNOSIS — Z12.31 ENCOUNTER FOR SCREENING MAMMOGRAM FOR BREAST CANCER: ICD-10-CM

## 2020-11-18 PROCEDURE — 77067 SCR MAMMO BI INCL CAD: CPT | Performed by: INTERNAL MEDICINE

## 2020-11-18 PROCEDURE — 77063 BREAST TOMOSYNTHESIS BI: CPT | Performed by: INTERNAL MEDICINE

## 2020-11-23 ENCOUNTER — TELEPHONE (OUTPATIENT)
Dept: FAMILY MEDICINE CLINIC | Facility: CLINIC | Age: 65
End: 2020-11-23

## 2020-11-23 PROCEDURE — 3008F BODY MASS INDEX DOCD: CPT | Performed by: FAMILY MEDICINE

## 2020-11-23 PROCEDURE — 3079F DIAST BP 80-89 MM HG: CPT | Performed by: FAMILY MEDICINE

## 2020-11-23 PROCEDURE — 3074F SYST BP LT 130 MM HG: CPT | Performed by: FAMILY MEDICINE

## 2020-11-23 PROCEDURE — 90471 IMMUNIZATION ADMIN: CPT | Performed by: FAMILY MEDICINE

## 2020-11-23 PROCEDURE — 90750 HZV VACC RECOMBINANT IM: CPT | Performed by: FAMILY MEDICINE

## 2020-11-25 ENCOUNTER — NURSE ONLY (OUTPATIENT)
Dept: FAMILY MEDICINE CLINIC | Facility: CLINIC | Age: 65
End: 2020-11-25
Payer: COMMERCIAL

## 2020-11-30 ENCOUNTER — OFFICE VISIT (OUTPATIENT)
Dept: FAMILY MEDICINE CLINIC | Facility: CLINIC | Age: 65
End: 2020-11-30
Payer: COMMERCIAL

## 2020-11-30 VITALS
HEART RATE: 64 BPM | DIASTOLIC BLOOD PRESSURE: 78 MMHG | OXYGEN SATURATION: 98 % | WEIGHT: 195 LBS | BODY MASS INDEX: 28.88 KG/M2 | SYSTOLIC BLOOD PRESSURE: 114 MMHG | HEIGHT: 69 IN | TEMPERATURE: 98 F | RESPIRATION RATE: 20 BRPM

## 2020-11-30 DIAGNOSIS — Z12.4 SCREENING FOR CERVICAL CANCER: ICD-10-CM

## 2020-11-30 DIAGNOSIS — E03.9 ACQUIRED HYPOTHYROIDISM: ICD-10-CM

## 2020-11-30 DIAGNOSIS — Z00.00 ROUTINE GENERAL MEDICAL EXAMINATION AT A HEALTH CARE FACILITY: Primary | ICD-10-CM

## 2020-11-30 PROBLEM — M89.8X5 LYTIC BONE LESION OF LEFT FEMUR: Status: RESOLVED | Noted: 2017-11-22 | Resolved: 2020-11-30

## 2020-11-30 PROBLEM — M89.9 LYTIC BONE LESION OF LEFT FEMUR: Status: RESOLVED | Noted: 2017-11-22 | Resolved: 2020-11-30

## 2020-11-30 PROBLEM — S83.232A COMPLEX TEAR OF MEDIAL MENISCUS OF LEFT KNEE AS CURRENT INJURY, INITIAL ENCOUNTER: Status: RESOLVED | Noted: 2018-03-14 | Resolved: 2020-11-30

## 2020-11-30 PROCEDURE — 99397 PER PM REEVAL EST PAT 65+ YR: CPT | Performed by: INTERNAL MEDICINE

## 2020-11-30 PROCEDURE — 3008F BODY MASS INDEX DOCD: CPT | Performed by: INTERNAL MEDICINE

## 2020-11-30 PROCEDURE — 87624 HPV HI-RISK TYP POOLED RSLT: CPT | Performed by: INTERNAL MEDICINE

## 2020-11-30 PROCEDURE — 3074F SYST BP LT 130 MM HG: CPT | Performed by: INTERNAL MEDICINE

## 2020-11-30 PROCEDURE — 88175 CYTOPATH C/V AUTO FLUID REDO: CPT | Performed by: INTERNAL MEDICINE

## 2020-11-30 PROCEDURE — 3078F DIAST BP <80 MM HG: CPT | Performed by: INTERNAL MEDICINE

## 2020-11-30 RX ORDER — LEVOTHYROXINE SODIUM 0.05 MG/1
50 TABLET ORAL
Qty: 90 TABLET | Refills: 3 | Status: SHIPPED | OUTPATIENT
Start: 2020-11-30 | End: 2021-05-18

## 2020-11-30 RX ORDER — ESTRADIOL 0.1 MG/G
CREAM VAGINAL
Qty: 42.5 G | Refills: 3 | Status: SHIPPED | OUTPATIENT
Start: 2020-11-30

## 2020-11-30 NOTE — PATIENT INSTRUCTIONS
Pneumovax 23 vaccine recommended for 65 years and older. Your previous immunization records show Pneumovax 23 in 2013 and Prevnar 13 in 2016.

## 2020-12-01 NOTE — PROGRESS NOTES
HPI:   Toyin Rojas is a 72year old female who presents for a well woman exam. Symptoms: denies discharge, itching, burning or dysuria, is menopausal.    No LMP recorded.  Patient is postmenopausal.  Previous pap: 2017 normal  Performs SBEs:somet balanced     REVIEW OF SYSTEMS:   GENERAL: feels well otherwise  SKIN: denies unusual skin lesions  HEENT:no vision or hearing changes; denies nasal congestion, sinus pain or sore throat  LUNGS: denies shortness of breath, chest heaviness or cough  CV: den euthyroid, labs up to date    3. Screening for cervical cancer  - THINPREP PAP SMEAR B; Future  - HPV HIGH RISK , THIN PREP COLLECTION; Future  - THINPREP PAP SMEAR B  - HPV HIGH RISK , THIN PREP COLLECTION      Follow up David Bo 39 in 1 year.   Pedro Pablo Charles was given

## 2021-03-09 DIAGNOSIS — Z23 NEED FOR VACCINATION: ICD-10-CM

## 2021-05-18 RX ORDER — LEVOTHYROXINE SODIUM 0.05 MG/1
50 TABLET ORAL
Qty: 90 TABLET | Refills: 0 | Status: SHIPPED | OUTPATIENT
Start: 2021-05-18 | End: 2021-08-17

## 2021-05-18 NOTE — TELEPHONE ENCOUNTER
Last ov 11/30/2020  Last refill 11/30/2020    .   Thyroid:    Lab Results   Component Value Date    TSH 3.210 06/26/2020    TSH 2.860 02/07/2020    TSH 3.130 06/04/2019    T4F 1.1 06/26/2020    T4F 1.3 02/07/2020    T4F 0.9 07/26/2017

## 2021-06-14 ENCOUNTER — HOSPITAL ENCOUNTER (OUTPATIENT)
Dept: GENERAL RADIOLOGY | Age: 66
Discharge: HOME | End: 2021-06-14
Attending: INTERNAL MEDICINE
Payer: COMMERCIAL

## 2021-06-14 ENCOUNTER — OFFICE VISIT (OUTPATIENT)
Dept: FAMILY MEDICINE CLINIC | Facility: CLINIC | Age: 66
End: 2021-06-14
Payer: COMMERCIAL

## 2021-06-14 VITALS
BODY MASS INDEX: 29.33 KG/M2 | SYSTOLIC BLOOD PRESSURE: 112 MMHG | HEART RATE: 91 BPM | TEMPERATURE: 97 F | DIASTOLIC BLOOD PRESSURE: 60 MMHG | WEIGHT: 198 LBS | RESPIRATION RATE: 20 BRPM | OXYGEN SATURATION: 98 % | HEIGHT: 69 IN

## 2021-06-14 DIAGNOSIS — E03.9 ACQUIRED HYPOTHYROIDISM: ICD-10-CM

## 2021-06-14 DIAGNOSIS — M25.469 EDEMA OF KNEE: ICD-10-CM

## 2021-06-14 DIAGNOSIS — S89.92XA INJURY OF LEFT KNEE, INITIAL ENCOUNTER: Primary | ICD-10-CM

## 2021-06-14 PROCEDURE — 3008F BODY MASS INDEX DOCD: CPT | Performed by: INTERNAL MEDICINE

## 2021-06-14 PROCEDURE — 99214 OFFICE O/P EST MOD 30 MIN: CPT | Performed by: INTERNAL MEDICINE

## 2021-06-14 PROCEDURE — 3074F SYST BP LT 130 MM HG: CPT | Performed by: INTERNAL MEDICINE

## 2021-06-14 PROCEDURE — 73560 X-RAY EXAM OF KNEE 1 OR 2: CPT | Performed by: INTERNAL MEDICINE

## 2021-06-14 PROCEDURE — 3078F DIAST BP <80 MM HG: CPT | Performed by: INTERNAL MEDICINE

## 2021-06-14 NOTE — PROGRESS NOTES
Danyell Vila is a 72year old female. HPI:   Here with left knee pain and swelling after falling directly on it last week. Tripped on the carpet at work and fell forward, broke her Apple watch. Did not make a report, embarrassed about the fall. normocephalic, TMs clear, throat clear  NECK: supple,no adenopathy   LUNGS: no cough, easy breathing  MS: left knee- no pain at the quadriceps tendon, cannot assess joint line tenderness due to diffuse tenderness with very light sensation; + bruising on mi

## 2021-06-28 ENCOUNTER — APPOINTMENT (OUTPATIENT)
Dept: CT IMAGING | Age: 66
End: 2021-06-28
Attending: EMERGENCY MEDICINE
Payer: COMMERCIAL

## 2021-06-28 ENCOUNTER — ANESTHESIA EVENT (OUTPATIENT)
Dept: SURGERY | Facility: HOSPITAL | Age: 66
DRG: 329 | End: 2021-06-28
Payer: COMMERCIAL

## 2021-06-28 ENCOUNTER — HOSPITAL ENCOUNTER (OUTPATIENT)
Age: 66
Discharge: HOME | End: 2021-06-28
Attending: EMERGENCY MEDICINE
Payer: COMMERCIAL

## 2021-06-28 ENCOUNTER — HOSPITAL ENCOUNTER (INPATIENT)
Facility: HOSPITAL | Age: 66
LOS: 4 days | Discharge: HOME HEALTH | DRG: 329 | End: 2021-07-02
Attending: SURGERY | Admitting: SURGERY
Payer: COMMERCIAL

## 2021-06-28 ENCOUNTER — ANESTHESIA (OUTPATIENT)
Dept: SURGERY | Facility: HOSPITAL | Age: 66
DRG: 329 | End: 2021-06-28
Payer: COMMERCIAL

## 2021-06-28 VITALS
DIASTOLIC BLOOD PRESSURE: 89 MMHG | BODY MASS INDEX: 28.14 KG/M2 | WEIGHT: 190 LBS | HEIGHT: 69 IN | TEMPERATURE: 98 F | OXYGEN SATURATION: 96 % | HEART RATE: 82 BPM | RESPIRATION RATE: 16 BRPM | SYSTOLIC BLOOD PRESSURE: 108 MMHG

## 2021-06-28 DIAGNOSIS — K63.1 BOWEL PERFORATION (HCC): Primary | ICD-10-CM

## 2021-06-28 DIAGNOSIS — K66.8 PERITONEAL FREE AIR: ICD-10-CM

## 2021-06-28 PROBLEM — Z01.818 PRE-OP TESTING: Status: ACTIVE | Noted: 2021-06-28

## 2021-06-28 LAB
ANTIBODY SCREEN: NEGATIVE
GLUCOSE BLD-MCNC: 124 MG/DL (ref 70–99)
RH BLOOD TYPE: POSITIVE

## 2021-06-28 PROCEDURE — 74177 CT ABD & PELVIS W/CONTRAST: CPT | Performed by: EMERGENCY MEDICINE

## 2021-06-28 PROCEDURE — 85025 COMPLETE CBC W/AUTO DIFF WBC: CPT | Performed by: EMERGENCY MEDICINE

## 2021-06-28 PROCEDURE — 96374 THER/PROPH/DIAG INJ IV PUSH: CPT

## 2021-06-28 PROCEDURE — 0DTN0ZZ RESECTION OF SIGMOID COLON, OPEN APPROACH: ICD-10-PCS | Performed by: SURGERY

## 2021-06-28 PROCEDURE — 96361 HYDRATE IV INFUSION ADD-ON: CPT

## 2021-06-28 PROCEDURE — 81002 URINALYSIS NONAUTO W/O SCOPE: CPT | Performed by: EMERGENCY MEDICINE

## 2021-06-28 PROCEDURE — 80047 BASIC METABLC PNL IONIZED CA: CPT

## 2021-06-28 PROCEDURE — 3074F SYST BP LT 130 MM HG: CPT | Performed by: SURGERY

## 2021-06-28 PROCEDURE — 99205 OFFICE O/P NEW HI 60 MIN: CPT

## 2021-06-28 PROCEDURE — 99215 OFFICE O/P EST HI 40 MIN: CPT

## 2021-06-28 PROCEDURE — 0D1N0Z4 BYPASS SIGMOID COLON TO CUTANEOUS, OPEN APPROACH: ICD-10-PCS | Performed by: SURGERY

## 2021-06-28 PROCEDURE — 3078F DIAST BP <80 MM HG: CPT | Performed by: SURGERY

## 2021-06-28 PROCEDURE — 76942 ECHO GUIDE FOR BIOPSY: CPT | Performed by: INTERNAL MEDICINE

## 2021-06-28 PROCEDURE — 99223 1ST HOSP IP/OBS HIGH 75: CPT | Performed by: SURGERY

## 2021-06-28 RX ORDER — ONDANSETRON 2 MG/ML
4 INJECTION INTRAMUSCULAR; INTRAVENOUS AS NEEDED
Status: DISCONTINUED | OUTPATIENT
Start: 2021-06-28 | End: 2021-06-28 | Stop reason: HOSPADM

## 2021-06-28 RX ORDER — LIDOCAINE HYDROCHLORIDE 10 MG/ML
INJECTION, SOLUTION EPIDURAL; INFILTRATION; INTRACAUDAL; PERINEURAL AS NEEDED
Status: DISCONTINUED | OUTPATIENT
Start: 2021-06-28 | End: 2021-06-28 | Stop reason: SURG

## 2021-06-28 RX ORDER — SODIUM CHLORIDE, SODIUM LACTATE, POTASSIUM CHLORIDE, CALCIUM CHLORIDE 600; 310; 30; 20 MG/100ML; MG/100ML; MG/100ML; MG/100ML
INJECTION, SOLUTION INTRAVENOUS CONTINUOUS
Status: CANCELLED | OUTPATIENT
Start: 2021-06-28

## 2021-06-28 RX ORDER — MIDAZOLAM HYDROCHLORIDE 1 MG/ML
1 INJECTION INTRAMUSCULAR; INTRAVENOUS EVERY 5 MIN PRN
Status: DISCONTINUED | OUTPATIENT
Start: 2021-06-28 | End: 2021-06-28 | Stop reason: HOSPADM

## 2021-06-28 RX ORDER — HYDROMORPHONE HYDROCHLORIDE 1 MG/ML
0.2 INJECTION, SOLUTION INTRAMUSCULAR; INTRAVENOUS; SUBCUTANEOUS EVERY 2 HOUR PRN
Status: DISCONTINUED | OUTPATIENT
Start: 2021-06-28 | End: 2021-07-02

## 2021-06-28 RX ORDER — ALBUTEROL SULFATE 2.5 MG/3ML
2.5 SOLUTION RESPIRATORY (INHALATION) AS NEEDED
Status: DISCONTINUED | OUTPATIENT
Start: 2021-06-28 | End: 2021-06-28 | Stop reason: HOSPADM

## 2021-06-28 RX ORDER — ACETAMINOPHEN 10 MG/ML
1000 INJECTION, SOLUTION INTRAVENOUS ONCE
Status: COMPLETED | OUTPATIENT
Start: 2021-06-28 | End: 2021-06-28

## 2021-06-28 RX ORDER — SCOLOPAMINE TRANSDERMAL SYSTEM 1 MG/1
1 PATCH, EXTENDED RELEASE TRANSDERMAL
Status: DISCONTINUED | OUTPATIENT
Start: 2021-06-28 | End: 2021-07-01 | Stop reason: HOSPADM

## 2021-06-28 RX ORDER — MAGNESIUM OXIDE 400 MG (241.3 MG MAGNESIUM) TABLET
400 TABLET DAILY
Status: DISCONTINUED | OUTPATIENT
Start: 2021-06-28 | End: 2021-07-02

## 2021-06-28 RX ORDER — ACETAMINOPHEN 500 MG
1000 TABLET ORAL ONCE
Status: CANCELLED | OUTPATIENT
Start: 2021-06-28 | End: 2021-06-28

## 2021-06-28 RX ORDER — KETOROLAC TROMETHAMINE 30 MG/ML
15 INJECTION, SOLUTION INTRAMUSCULAR; INTRAVENOUS ONCE
Status: COMPLETED | OUTPATIENT
Start: 2021-06-28 | End: 2021-06-28

## 2021-06-28 RX ORDER — HYDROMORPHONE HYDROCHLORIDE 1 MG/ML
0.4 INJECTION, SOLUTION INTRAMUSCULAR; INTRAVENOUS; SUBCUTANEOUS EVERY 2 HOUR PRN
Status: DISCONTINUED | OUTPATIENT
Start: 2021-06-28 | End: 2021-07-02

## 2021-06-28 RX ORDER — HEPARIN SODIUM 5000 [USP'U]/ML
5000 INJECTION, SOLUTION INTRAVENOUS; SUBCUTANEOUS ONCE
Status: COMPLETED | OUTPATIENT
Start: 2021-06-28 | End: 2021-06-28

## 2021-06-28 RX ORDER — HYDROMORPHONE HYDROCHLORIDE 1 MG/ML
0.4 INJECTION, SOLUTION INTRAMUSCULAR; INTRAVENOUS; SUBCUTANEOUS EVERY 5 MIN PRN
Status: DISCONTINUED | OUTPATIENT
Start: 2021-06-28 | End: 2021-06-28 | Stop reason: HOSPADM

## 2021-06-28 RX ORDER — ONDANSETRON 2 MG/ML
4 INJECTION INTRAMUSCULAR; INTRAVENOUS EVERY 4 HOURS PRN
Status: DISCONTINUED | OUTPATIENT
Start: 2021-06-28 | End: 2021-07-02

## 2021-06-28 RX ORDER — DIPHENHYDRAMINE HYDROCHLORIDE 50 MG/ML
12.5 INJECTION INTRAMUSCULAR; INTRAVENOUS AS NEEDED
Status: DISCONTINUED | OUTPATIENT
Start: 2021-06-28 | End: 2021-06-28 | Stop reason: HOSPADM

## 2021-06-28 RX ORDER — MIDAZOLAM HYDROCHLORIDE 1 MG/ML
INJECTION INTRAMUSCULAR; INTRAVENOUS AS NEEDED
Status: DISCONTINUED | OUTPATIENT
Start: 2021-06-28 | End: 2021-06-28 | Stop reason: SURG

## 2021-06-28 RX ORDER — ROCURONIUM BROMIDE 10 MG/ML
INJECTION, SOLUTION INTRAVENOUS AS NEEDED
Status: DISCONTINUED | OUTPATIENT
Start: 2021-06-28 | End: 2021-06-28 | Stop reason: SURG

## 2021-06-28 RX ORDER — SODIUM CHLORIDE 9 MG/ML
INJECTION, SOLUTION INTRAVENOUS CONTINUOUS
Status: DISCONTINUED | OUTPATIENT
Start: 2021-06-28 | End: 2021-06-29

## 2021-06-28 RX ORDER — ACETAMINOPHEN 500 MG
1000 TABLET ORAL EVERY 8 HOURS SCHEDULED
Status: DISCONTINUED | OUTPATIENT
Start: 2021-06-28 | End: 2021-07-02

## 2021-06-28 RX ORDER — HEPARIN SODIUM 5000 [USP'U]/ML
5000 INJECTION, SOLUTION INTRAVENOUS; SUBCUTANEOUS EVERY 8 HOURS SCHEDULED
Status: DISCONTINUED | OUTPATIENT
Start: 2021-06-29 | End: 2021-07-02

## 2021-06-28 RX ORDER — METRONIDAZOLE 500 MG/100ML
500 INJECTION, SOLUTION INTRAVENOUS ONCE
Status: COMPLETED | OUTPATIENT
Start: 2021-06-28 | End: 2021-06-28

## 2021-06-28 RX ORDER — DEXAMETHASONE SODIUM PHOSPHATE 4 MG/ML
VIAL (ML) INJECTION AS NEEDED
Status: DISCONTINUED | OUTPATIENT
Start: 2021-06-28 | End: 2021-06-28 | Stop reason: SURG

## 2021-06-28 RX ORDER — MEPERIDINE HYDROCHLORIDE 25 MG/ML
12.5 INJECTION INTRAMUSCULAR; INTRAVENOUS; SUBCUTANEOUS AS NEEDED
Status: DISCONTINUED | OUTPATIENT
Start: 2021-06-28 | End: 2021-06-28 | Stop reason: HOSPADM

## 2021-06-28 RX ORDER — SODIUM CHLORIDE 9 MG/ML
INJECTION, SOLUTION INTRAVENOUS CONTINUOUS
Status: DISCONTINUED | OUTPATIENT
Start: 2021-06-28 | End: 2021-06-28 | Stop reason: HOSPADM

## 2021-06-28 RX ORDER — FAMOTIDINE 10 MG/ML
20 INJECTION, SOLUTION INTRAVENOUS 2 TIMES DAILY
Status: DISCONTINUED | OUTPATIENT
Start: 2021-06-28 | End: 2021-07-02

## 2021-06-28 RX ORDER — GLYCOPYRROLATE 0.2 MG/ML
INJECTION, SOLUTION INTRAMUSCULAR; INTRAVENOUS AS NEEDED
Status: DISCONTINUED | OUTPATIENT
Start: 2021-06-28 | End: 2021-06-28 | Stop reason: SURG

## 2021-06-28 RX ORDER — FAMOTIDINE 20 MG/1
20 TABLET ORAL 2 TIMES DAILY
Status: DISCONTINUED | OUTPATIENT
Start: 2021-06-28 | End: 2021-07-02

## 2021-06-28 RX ORDER — OXYCODONE HYDROCHLORIDE 5 MG/1
2.5 TABLET ORAL EVERY 4 HOURS PRN
Status: DISCONTINUED | OUTPATIENT
Start: 2021-06-28 | End: 2021-07-02

## 2021-06-28 RX ORDER — ONDANSETRON 2 MG/ML
INJECTION INTRAMUSCULAR; INTRAVENOUS AS NEEDED
Status: DISCONTINUED | OUTPATIENT
Start: 2021-06-28 | End: 2021-06-28 | Stop reason: SURG

## 2021-06-28 RX ORDER — POLYETHYLENE GLYCOL 3350 17 G/17G
17 POWDER, FOR SOLUTION ORAL DAILY PRN
Status: DISCONTINUED | OUTPATIENT
Start: 2021-06-28 | End: 2021-07-02

## 2021-06-28 RX ORDER — OXYCODONE HYDROCHLORIDE 5 MG/1
5 TABLET ORAL EVERY 4 HOURS PRN
Status: DISCONTINUED | OUTPATIENT
Start: 2021-06-28 | End: 2021-07-02

## 2021-06-28 RX ORDER — LEVOTHYROXINE SODIUM 0.05 MG/1
50 TABLET ORAL
Status: DISCONTINUED | OUTPATIENT
Start: 2021-06-29 | End: 2021-07-02

## 2021-06-28 RX ORDER — NALOXONE HYDROCHLORIDE 0.4 MG/ML
80 INJECTION, SOLUTION INTRAMUSCULAR; INTRAVENOUS; SUBCUTANEOUS AS NEEDED
Status: DISCONTINUED | OUTPATIENT
Start: 2021-06-28 | End: 2021-06-28 | Stop reason: HOSPADM

## 2021-06-28 RX ORDER — DOCUSATE SODIUM 100 MG/1
100 CAPSULE, LIQUID FILLED ORAL 2 TIMES DAILY
Status: DISCONTINUED | OUTPATIENT
Start: 2021-06-28 | End: 2021-07-02

## 2021-06-28 RX ORDER — NEOSTIGMINE METHYLSULFATE 1 MG/ML
INJECTION INTRAVENOUS AS NEEDED
Status: DISCONTINUED | OUTPATIENT
Start: 2021-06-28 | End: 2021-06-28 | Stop reason: SURG

## 2021-06-28 RX ORDER — PHENYLEPHRINE HCL 10 MG/ML
VIAL (ML) INJECTION AS NEEDED
Status: DISCONTINUED | OUTPATIENT
Start: 2021-06-28 | End: 2021-06-28 | Stop reason: SURG

## 2021-06-28 RX ORDER — BUPIVACAINE HYDROCHLORIDE 5 MG/ML
INJECTION, SOLUTION EPIDURAL; INTRACAUDAL AS NEEDED
Status: DISCONTINUED | OUTPATIENT
Start: 2021-06-28 | End: 2021-06-28 | Stop reason: SURG

## 2021-06-28 RX ORDER — SODIUM CHLORIDE, SODIUM LACTATE, POTASSIUM CHLORIDE, CALCIUM CHLORIDE 600; 310; 30; 20 MG/100ML; MG/100ML; MG/100ML; MG/100ML
INJECTION, SOLUTION INTRAVENOUS CONTINUOUS
Status: DISCONTINUED | OUTPATIENT
Start: 2021-06-28 | End: 2021-06-30

## 2021-06-28 RX ORDER — SODIUM CHLORIDE 9 MG/ML
1000 INJECTION, SOLUTION INTRAVENOUS ONCE
Status: COMPLETED | OUTPATIENT
Start: 2021-06-28 | End: 2021-06-28

## 2021-06-28 RX ORDER — ACETAMINOPHEN 10 MG/ML
INJECTION, SOLUTION INTRAVENOUS
Status: COMPLETED
Start: 2021-06-28 | End: 2021-06-28

## 2021-06-28 RX ADMIN — ONDANSETRON 4 MG: 2 INJECTION INTRAMUSCULAR; INTRAVENOUS at 16:23:00

## 2021-06-28 RX ADMIN — BUPIVACAINE HYDROCHLORIDE 50 ML: 5 INJECTION, SOLUTION EPIDURAL; INTRACAUDAL at 16:24:00

## 2021-06-28 RX ADMIN — ROCURONIUM BROMIDE 60 MG: 10 INJECTION, SOLUTION INTRAVENOUS at 15:02:00

## 2021-06-28 RX ADMIN — PHENYLEPHRINE HCL 50 MCG: 10 MG/ML VIAL (ML) INJECTION at 15:14:00

## 2021-06-28 RX ADMIN — GLYCOPYRROLATE 0.8 MG: 0.2 INJECTION, SOLUTION INTRAMUSCULAR; INTRAVENOUS at 16:24:00

## 2021-06-28 RX ADMIN — METRONIDAZOLE 500 MG: 500 INJECTION, SOLUTION INTRAVENOUS at 15:07:00

## 2021-06-28 RX ADMIN — DEXAMETHASONE SODIUM PHOSPHATE 8 MG: 4 MG/ML VIAL (ML) INJECTION at 15:04:00

## 2021-06-28 RX ADMIN — MIDAZOLAM HYDROCHLORIDE 2 MG: 1 INJECTION INTRAMUSCULAR; INTRAVENOUS at 14:53:00

## 2021-06-28 RX ADMIN — NEOSTIGMINE METHYLSULFATE 5 MG: 1 INJECTION INTRAVENOUS at 16:24:00

## 2021-06-28 RX ADMIN — LIDOCAINE HYDROCHLORIDE 50 MG: 10 INJECTION, SOLUTION EPIDURAL; INFILTRATION; INTRACAUDAL; PERINEURAL at 14:59:00

## 2021-06-28 NOTE — ED PROVIDER NOTES
Patient Seen in: [facility]      History   Patient presents with:  Abdominal Pain    Stated Complaint: severe abdominal pain x 3 days    HPI/Subjective:   HPI    42-year-old female presents to the immediate care complaining of from 3 days O2 Device None (Room air)       Current:BP 99/72   Pulse 88   Temp 97.7 °F (36.5 °C)   Resp 18   Ht 175.3 cm (5' 9\")   Wt 86.2 kg   SpO2 96%   BMI 28.06 kg/m²         Physical Exam  General: 60-year-old female complaining of abdominal discomfort.     ESTELA information is transmitted to the [facility] ([facility] of Radiology) [address]) which includes the Dose Index Registry.       PATIENT STATED HISTORY:(As transcribed by Technologist)  Kay lower quadrant pain with fever.      cm which is not walled-off.  Developing abscess cannot be excluded. 3. Bladder wall thickening.  Correlation with urinalysis is recommended to exclude cystitis. 4. This critical value result was called to Dr. Korin Medellin on 6/28/2021 at 10:43 a.m. Patricia Waters them the patient's cell phone number so they can contact her as well. The patient was given explicit instructions to head directly to day surgery. She was discharged to the hospital for further care. Explicitly told to stay n.p.o.                    Disp

## 2021-06-28 NOTE — OPERATIVE REPORT
[facility]  Op Note    Korin Mijares Location: OR   [facility] [id number] MRN [id number]   Admission Date 6/28/2021 Operation Date 6/28/2021   Attending Physician Grant Cadena MD Operating Physician Kaylin Wood MD   DATE OF OPERATION: blade scalpel. Cautery was used to obtain hemostasis and then to continue the dissection down through the tissue planes until the abdomen was entered. The upper abdomen was palpated, and no indurated tissues were noted.   The lower abdomen was palpated, a muscle and fascia. The proximal end of the colon was delivered through this site. Gown and gloves were changed. The fascia along the midline was closed with a #1 Vicryl on the posterior fascia and a looped PDS on the anterior fascia.   The subcutaneous t

## 2021-06-28 NOTE — PAYOR COMM NOTE
--------------  ADMISSION REVIEW     Payor: DAVID PEÑA  Subscriber #:  [id number]  Authorization Number: pend IP # [id number]    Admit date: 6/28/21  Admit time: 12:09 PM       Admitting Physician: Maryann Pitts MD  Attending Physician:  Darien Jaime source of the perforation. This may be secondary to perforated diverticulitis although no significant wall thickening of the colon is identified. Patient had leukocytosis noted at 22.6.     The patient's surgical history is significant for a laparoscopic hematuria  Hema/Lymph:  Negative for easy bleeding and easy bruising  Integumentary:  Negative for pruritus and rash  Musculoskeletal:  Negative for bone/joint symptoms  Neurological:  Negative for gait disturbance  Psychiatric:  Negative for inappropriate intraperitoneal air without a clear source. The patient does have a history of diverticulosis as seen on colonoscopy. She also admits to taking Aleve daily. Plan:  1.  The patient will be taken to the operating room this afternoon for exploratory la Vinay Donning is a pocket of air with minimal fluid in the pelvis measuring 2.5 x 3.6 cm.  This is not walled-off but developing abscess cannot be excluded.     AORTA/VASCULAR: Percell Link is normal in caliber.  Mild atheromatous calcifications of the aorta.    BONES: initiated by ARACELIS Pizarro. The PA saw the patient in conjunction with me. The PA performed a history, exam, and developed the assessment and plan.  I agree with the mentioned assessment and plan and have provided further documentation of my own, if [place]

## 2021-06-28 NOTE — ANESTHESIA POSTPROCEDURE EVALUATION
[address] [facility] Patient Status:  Inpatient   Age/Gender 72year old female MRN [id number]   [facility] Attending Mariano Bedoya MD   [facility] Day # 0 PCP Yan Ann DO       Anesthesia Post-op Note    EXP

## 2021-06-28 NOTE — ANESTHESIA PROCEDURE NOTES
[facility]  Performed by: Esteban Knutson MD  Authorized by: Esteban Knutson MD       General Information and Staff    Start Time:  6/28/2021 4:23 PM  End Time:  6/28/2021 4:28 PM  Anesthesiologist:  Esteban Knutson MD  Performed by:   Anesthesiologist

## 2021-06-28 NOTE — ANESTHESIA PREPROCEDURE EVALUATION
PRE-OP EVALUATION    Patient Name: Iban Gary    Admit Diagnosis: Peritoneal free air [K66.8]    Pre-op Diagnosis: Peritoneal free air [K66.8]    EXPLORATORY LAPAROTOMY    Anesthesia Procedure: EXPLORATORY LAPAROTOMY (N/A )    Surgeon(s) and Ro • TONSILLECTOMY     • TUBAL LIGATION       Social History    Tobacco Use      Smoking status: Never Smoker      Smokeless tobacco: Never Used    Alcohol use:  Yes      Alcohol/week: 2.0 standard drinks      Types: 1 Glasses of wine, 1 Cans of beer per wee

## 2021-06-28 NOTE — ED QUICK NOTES
1125 PIV to right hand wrap with rolled gauze.   [address] [facility] report to Same Day Charge RN at ext [phone]

## 2021-06-28 NOTE — H&P
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 The patient does have a history of diverticulosis as seen on colonoscopy. She also admits to taking Aleve daily. Plan:  1. The patient will be taken to the operating room this afternoon for exploratory laparotomy by Dr. Gloria Thornton.   Discussed w past.    General: Alert, orientated x3. Cooperative. No apparent distress. Abdomen: Soft, non-distended,+ LLQ tenderness, with + rebound but no guarding. No peritoneal signs. CT appendix   I personally reviewed the report.     FINDINGS:     LUNG BASES: 3. Bladder wall thickening.  Correlation with urinalysis is recommended to exclude cystitis. 4. This critical value result was called to Dr. Jorge Feliz on 6/28/2021 at 10:43 a.m. .  Results read back was performed.  .           Dictated by (CST): Cristal,

## 2021-06-28 NOTE — ANESTHESIA PROCEDURE NOTES
Airway  Date/Time: 6/28/2021 3:02 PM  Urgency: elective      General Information and Staff    Patient location during procedure: OR  Anesthesiologist: Nasir Gonzalez MD  Performed: anesthesiologist     Indications and Patient Condition  Indications for ai

## 2021-06-29 LAB
ANION GAP SERPL CALC-SCNC: 5 MMOL/L (ref 0–18)
BASOPHILS # BLD AUTO: 0.01 X10(3) UL (ref 0–0.2)
BASOPHILS NFR BLD AUTO: 0.1 %
BUN BLD-MCNC: 15 MG/DL (ref 7–18)
BUN/CREAT SERPL: 24.2 (ref 10–20)
CALCIUM BLD-MCNC: 8.3 MG/DL (ref 8.5–10.1)
CHLORIDE SERPL-SCNC: 111 MMOL/L (ref 98–112)
CO2 SERPL-SCNC: 25 MMOL/L (ref 21–32)
CREAT BLD-MCNC: 0.62 MG/DL
DEPRECATED RDW RBC AUTO: 51.8 FL (ref 35.1–46.3)
EOSINOPHIL # BLD AUTO: 0 X10(3) UL (ref 0–0.7)
EOSINOPHIL NFR BLD AUTO: 0 %
ERYTHROCYTE [DISTWIDTH] IN BLOOD BY AUTOMATED COUNT: 14.2 % (ref 11–15)
GLUCOSE BLD-MCNC: 115 MG/DL (ref 70–99)
HAV IGM SER QL: 2.2 MG/DL (ref 1.6–2.6)
HCT VFR BLD AUTO: 33.7 %
HGB BLD-MCNC: 11.1 G/DL
IMM GRANULOCYTES # BLD AUTO: 0.14 X10(3) UL (ref 0–1)
IMM GRANULOCYTES NFR BLD: 0.9 %
LYMPHOCYTES # BLD AUTO: 0.91 X10(3) UL (ref 1–4)
LYMPHOCYTES NFR BLD AUTO: 5.8 %
MCH RBC QN AUTO: 32.7 PG (ref 26–34)
MCHC RBC AUTO-ENTMCNC: 32.9 G/DL (ref 31–37)
MCV RBC AUTO: 99.4 FL
MONOCYTES # BLD AUTO: 0.56 X10(3) UL (ref 0.1–1)
MONOCYTES NFR BLD AUTO: 3.6 %
NEUTROPHILS # BLD AUTO: 13.95 X10 (3) UL (ref 1.5–7.7)
NEUTROPHILS # BLD AUTO: 13.95 X10(3) UL (ref 1.5–7.7)
NEUTROPHILS NFR BLD AUTO: 89.6 %
OSMOLALITY SERPL CALC.SUM OF ELEC: 294 MOSM/KG (ref 275–295)
PHOSPHATE SERPL-MCNC: 3 MG/DL (ref 2.5–4.9)
PLATELET # BLD AUTO: 231 10(3)UL (ref 150–450)
POTASSIUM SERPL-SCNC: 3.8 MMOL/L (ref 3.5–5.1)
RBC # BLD AUTO: 3.39 X10(6)UL
SODIUM SERPL-SCNC: 141 MMOL/L (ref 136–145)
WBC # BLD AUTO: 15.6 X10(3) UL (ref 4–11)

## 2021-06-29 NOTE — PROGRESS NOTES
Pt ambulated in hallway and room, independent after set up. MUMTAZ to bulb suction, serosanguinous output. Pt's urine output increased, davis removed at 1615. Patient due to void. IVF SL. Pt tolerating clears, ensure supplement given.  Pt denies any SOB, chest

## 2021-06-29 NOTE — PHYSICAL THERAPY NOTE
PHYSICAL THERAPY QUICK EVALUATION - INPATIENT    Room Number: 310/310-A  Evaluation Date: 6/29/2021  Presenting Problem: s/p colon resection with colostomy and drainage of abscess  Physician Order: PT Eval and Treat    Problem List  Principal Problem: over in bed (including adjusting bedclothes, sheets and blankets)?: None   -   Sitting down on and standing up from a chair with arms (e.g., wheelchair, bedside commode, etc.): None   -   Moving from lying on back to sitting on the side of the bed?: None this admission. GOALS  Patient was able to achieve the following goals . ..     Patient was able to transfer At previous, functional level   Patient able to ambulate on level surfaces At previous, functional level

## 2021-06-29 NOTE — OCCUPATIONAL THERAPY NOTE
OCCUPATIONAL THERAPY EVALUATION - INPATIENT     Room Number: 310/310-A  Evaluation Date: 6/29/2021  Type of Evaluation: Initial  Presenting Problem: Diverticulitis    Physician Order: IP Consult to Occupational Therapy  Reason for Therapy: ADL/IADL Dysfunc Status:  WFL - within functional limits    VISION  Current Vision: no visual deficits    PERCEPTION  Overall Perception Status:   WFL - within functional limits    SENSATION  Light touch:  intact  Proprioception:  intact  Stereognosis:  intact  Sharp/Dull: levels and wanting to rest. Pt was not willing at this time to complete any ADLs or additional functional mobility due to pain in surgical site and catheter discomfort.  In addition, pt verbalizes that she is very fatigued and just wants to rest. Pt non-com the above deficits, maximizing patient’s ability to return safely to her prior level of function.     Patient Complexity  Occupational Profile/Medical History MODERATE - Expanded review of history including review of medical or therapy record   Specific per

## 2021-06-29 NOTE — PLAN OF CARE
Pt a/ox4, c/o mild abd pain, prn dilaudid and vernon tylenol given w/relief. Denies n/v tolerating clears, eras protocol. Abd soft and non-distended. Colostomy w/ scant bloody drainage. Stoma red and moist. Midline incision theresa dressing cdi.  Ralph w/ serosang  pt/family on patient safety including physical limitations  - Instruct pt to call for assistance with activity based on assessment  - Modify environment to reduce risk of injury  - Provide assistive devices as appropriate  - Consider OT/PT consult to perry medications  - Encourage mobilization and activity  - Obtain nutritional consult as needed  - Establish a toileting routine/schedule  - Consider collaborating with pharmacy to review patient's medication profile  Outcome: Progressing  Goal: Maintains adequ

## 2021-06-29 NOTE — PROGRESS NOTES
[facility]  Progress Note    Danyell Vila Patient Status:  Inpatient    1955 MRN [id number]   [facility] 3NW-A Attending Amina Arguelles MD   [address] Day # 1 PCP Vianney East DO     Subjective:   The patient is resting diverticulitis    POD 1 exploratory laparotomy with sigmoid colon resection and creation of end colostomy for perforated diverticulitis    Plan:  1. The patient is doing well postoperatively.   2. Continue clear liquids until the patient begins to have outp

## 2021-06-29 NOTE — CONSULTS
[facility]  Inpatient Wound Care/Ostomy  Contact Note    Patsey Signs Patient Status:  Inpatient    1955 MRN [id number]   [facility] 3NW-A Attending Azael Nelson MD   [facility] Day # 1 PCP DO Waldemar Malagon

## 2021-06-29 NOTE — PLAN OF CARE
Pt alert and oriented x4. Up with SB assist. Ambulating in hallway without difficulty. Tolerating clear liquids. LLQ colostomy, bag intact, stoma moist/red; minimal bloody drainage in bag; no gas in noted. Jasso intact, draining nayan urine.  SX MD aware of cognitive and physical deficits and behaviors that affect risk of falls.   - [place] fall precautions as indicated by assessment.  - Educate pt/family on patient safety including physical limitations  - Instruct pt to call for assistance with activity bas INTERVENTIONS:  - Assess bowel function  - Maintain adequate hydration with IV or PO as ordered and tolerated  - Evaluate effectiveness of GI medications  - Encourage mobilization and activity  - Obtain nutritional consult as needed  - Establish a toiletin

## 2021-06-29 NOTE — HOME CARE LIAISON
Patient provided with list of [facility] providers from [address], patient choice is [address]. Agency reserved in [address] and contact information placed on AVS.   Financial interest disclosure provided to patient.

## 2021-06-30 RX ORDER — KETOROLAC TROMETHAMINE 15 MG/ML
15 INJECTION, SOLUTION INTRAMUSCULAR; INTRAVENOUS EVERY 6 HOURS PRN
Status: DISPENSED | OUTPATIENT
Start: 2021-06-30 | End: 2021-07-02

## 2021-06-30 RX ORDER — KETOROLAC TROMETHAMINE 30 MG/ML
30 INJECTION, SOLUTION INTRAMUSCULAR; INTRAVENOUS EVERY 6 HOURS PRN
Status: DISPENSED | OUTPATIENT
Start: 2021-06-30 | End: 2021-07-02

## 2021-06-30 RX ORDER — KETOROLAC TROMETHAMINE 30 MG/ML
INJECTION, SOLUTION INTRAMUSCULAR; INTRAVENOUS
Status: COMPLETED
Start: 2021-06-30 | End: 2021-06-30

## 2021-06-30 NOTE — PLAN OF CARE
Pt alert and oriented x4. Sitting up in chair. Tolerating pain well with scheduled pain medication. Stoma moist/red, some serosaginous draiange. Minimal gas noted in colostomy appliance.  ML incision with aquacel; writing RN unable to remove dressing due to that affect risk of falls.   - [place] fall precautions as indicated by assessment.  - Educate pt/family on patient safety including physical limitations  - Instruct pt to call for assistance with activity based on assessment  - Modify environment to redu adequate hydration with IV or PO as ordered and tolerated  - Evaluate effectiveness of GI medications  - Encourage mobilization and activity  - Obtain nutritional consult as needed  - Establish a toileting routine/schedule  - Consider collaborating with ph

## 2021-06-30 NOTE — OCCUPATIONAL THERAPY NOTE
OCCUPATIONAL THERAPY TREATMENT NOTE - INPATIENT     Room Number: 310/310-A  Session: 1  Number of Visits to Meet Established Goals: 1    Presenting Problem: Diverticulitis    History related to current admission:  Pt is a 72year old female admit on 6/28/2 Impairment: 15.86%  Standardized Score (AM-PAC Scale): 51.12  CMS Modifier (G-Code): CI    FUNCTIONAL TRANSFER ASSESSMENT  Supine to Sit : Supervision  Sit to Stand: Modified independent    Skilled Therapy Provided: Pt received semi-supine in hospital bed. system of spouse during the recovery period. OT to sign off. OT Discharge Recommendations: Home with home health PT/OT  OT Device Recommendations: TBD    PLAN  OT Treatment Plan: Balance activities; Energy conservation/work simplification techniques;IADL

## 2021-06-30 NOTE — PAYOR COMM NOTE
--------------  CONTINUED STAY REVIEW    Payor: DAVID PEÑA  Subscriber #:  [id number]  Authorization Number: pend IP # [id number]    Admit date: 6/28/21  Admit time: 12:09 PM    Admitting Physician: Geri Faria MD  Attending Physician:  Ruby Larson 6/29/2021 2221 Given 5,000 Units Subcutaneous (Left Lower Abdomen) Gabe Rodriguez RN      ketorolac tromethamine (TORADOL) 30 MG/ML injection 30 mg     Date Action Dose Route User    6/30/2021 0800 Given 30 mg Intravenous Mario Crouch RN      Levothyroxine

## 2021-06-30 NOTE — PLAN OF CARE
Patient reports neck pain extending into left arm and hand x months, worsening since Saturday. Reports having steroid injection Friday afternoon. Admits to nausea, dry mouth, blood sugars in 300s-500s since Friday. Has insulin pump for type 1 diabetes.  Was Pt is A&O, wear glasses, VSS, with mild c/o pain to abdomen- medication given. Is a bedside encouraged. Abdomen is soft and tender with hypoactive bowel sounds. LLQ colostomy with small serosanguinous output. Midline incision with aquacel C/D/I.  Right MUMTAZ w Educate pt/family on patient safety including physical limitations  - Instruct pt to call for assistance with activity based on assessment  - Modify environment to reduce risk of injury  - Provide assistive devices as appropriate  - Consider OT/PT consult medications  - Encourage mobilization and activity  - Obtain nutritional consult as needed  - Establish a toileting routine/schedule  - Consider collaborating with pharmacy to review patient's medication profile  Outcome: Progressing  Goal: Maintains adequ

## 2021-06-30 NOTE — PROGRESS NOTES
[facility]  Progress Note    Tahmina Geronimo Patient Status:  Inpatient    1955 MRN [id number]   [facility] 3NW-A Attending Pal Fox MD   [facility] Day # 2 PCP aLcey Davis DO     Subjective:  Pt with the beginnings

## 2021-07-01 RX ORDER — MAGNESIUM OXIDE 400 MG (241.3 MG MAGNESIUM) TABLET
1 TABLET NIGHTLY
Status: DISCONTINUED | OUTPATIENT
Start: 2021-07-01 | End: 2021-07-02

## 2021-07-01 NOTE — PLAN OF CARE
Pt alert and oriented x4. Tolerating clear liquids; advanced to full liquids today. Will advance as tolerated. ERAS protocol. Abd with ML incision with staples; colostomy with gas and liquid brown stool. Stoma moist/red.  Jpx1 with gauze/tape dressing; bulb pain with activity and pre-medicate as appropriate  7/1/2021 1142 by Lisa Ibrahim RN  Outcome: Progressing  7/1/2021 1141 by Lisa Ibrahim RN  Outcome: Progressing  7/1/2021 1141 by Lisa Ibrahim RN  Outcome: Progressing     Problem: SAFETY ADULT Progressing  7/1/2021 1141 by Liliam Arriaza RN  Outcome: Progressing  7/1/2021 1141 by Liliam Arriaza RN  Outcome: Progressing     Problem: GASTROINTESTINAL - ADULT  Goal: Minimal or absence of nausea and vomiting  Description: INTERVENTIONS:  - Maint

## 2021-07-01 NOTE — PLAN OF CARE
Pt is A&O, VSS, with mild c/o abdominal pain- declined pain medication at this time. Pt is on RA with bilateral clear diminished lung sounds, . Abdomen is soft and nontender with hypoactive bowel sounds. Midline incision with staples- JS.  MARILYNN PANDYA with s physical deficits and behaviors that affect risk of falls.   - [place] fall precautions as indicated by assessment.  - Educate pt/family on patient safety including physical limitations  - Instruct pt to call for assistance with activity based on assessme Assess bowel function  - Maintain adequate hydration with IV or PO as ordered and tolerated  - Evaluate effectiveness of GI medications  - Encourage mobilization and activity  - Obtain nutritional consult as needed  - Establish a toileting routine/schedule

## 2021-07-01 NOTE — PROGRESS NOTES
[facility]  Progress Note    Patsey Signs Patient Status:  Inpatient    1955 MRN [id number]   [facility] 3NW-A Attending Azael Nelson MD   [facility] Day # 3 PCP Vic Santos DO     Subjective:  Pt with flatus and sto

## 2021-07-01 NOTE — DIETARY NOTE
[facility]    NUTRITION ASSESSMENT    Pt does not meet malnutrition criteria. NUTRITION INTERVENTION:  1. Meal and Snacks - monitor patient po intake. Encourage adequate po of appropriate diet.   2. Nutrition Education - Provided edu on post-colosto FOOD/NUTRITION RELATED HISTORY:   Appetite: Good  Intake: 100%  Intake Meeting Needs: Yes  Food Allergies: No  Cultural/Ethnic/Yazidism Preferences Addresses: Yes    GI SYSTEM REVIEW: abdominal pain    NUTRITION RELATED PHYSICAL FINDINGS:     1. Body

## 2021-07-01 NOTE — PROGRESS NOTES
Pt resting in bed. Tolerated full liquids; will advance to soft diet. Colostomy putting out liquid stool and gas. No needs at this time, will continue to monitor. Family at bedside.

## 2021-07-01 NOTE — PAYOR COMM NOTE
--------------  CONTINUED STAY REVIEW    Payor: DAVID PPO  Subscriber #:  [id number]  Authorization Number: pend IP # [id number]    Admit date: 6/28/21  Admit time: 12:09 PM    Admitting Physician: Brad Benton MD  Attending Physician:  Bam Valderrama Dose Route User    6/30/2021 1628 Given 15 mg Intravenous Aurora Mcgowan, RN      ketorolac tromethamine (TORADOL) 30 MG/ML injection 30 mg     Date Action Dose Route User    7/1/2021 0611 Given 30 mg Intravenous Blanco Adamson RN      Levothyroxine Sodium (S

## 2021-07-02 VITALS
SYSTOLIC BLOOD PRESSURE: 116 MMHG | TEMPERATURE: 99 F | BODY MASS INDEX: 29 KG/M2 | RESPIRATION RATE: 18 BRPM | WEIGHT: 195.56 LBS | DIASTOLIC BLOOD PRESSURE: 75 MMHG | HEART RATE: 62 BPM | OXYGEN SATURATION: 96 %

## 2021-07-02 RX ORDER — HYDROCODONE BITARTRATE AND ACETAMINOPHEN 5; 325 MG/1; MG/1
1 TABLET ORAL EVERY 6 HOURS PRN
Qty: 15 TABLET | Refills: 0 | Status: SHIPPED | OUTPATIENT
Start: 2021-07-02

## 2021-07-02 RX ORDER — AMOXICILLIN AND CLAVULANATE POTASSIUM 875; 125 MG/1; MG/1
1 TABLET, FILM COATED ORAL 2 TIMES DAILY
Qty: 20 TABLET | Refills: 0 | Status: SHIPPED | OUTPATIENT
Start: 2021-07-02 | End: 2021-07-12

## 2021-07-02 NOTE — PROGRESS NOTES
Pt cleared by all MD's for discharge. Discharge education completed with spouse at bedside, all questions answered. Colostomy care reviewed, MUMTAZ drain emptied and reviewed and drain cup provided. PIV removed, belongings packed.  Scripts for Augmentin and Nor

## 2021-07-02 NOTE — PROGRESS NOTES
[facility]  Progress Note    Iban Gary Patient Status:  Inpatient    1955 MRN [id number]   [facility] 3NW-A Attending Arpan Vail MD   [address] Day # 4 PCP Tania Mesa DO     Subjective:   The patient states anabel AM

## 2021-07-02 NOTE — PLAN OF CARE
Patient A&Ox4. VSS. Pain managed with scheduled tylenol and prn medication. IV abx continued. Tolerating diet. Midline incision staples intact, noted bruising to upper part of incision. Ralph drain with serosanguinous output.  Colostomy still with stool and ga safety including physical limitations  - Instruct pt to call for assistance with activity based on assessment  - Modify environment to reduce risk of injury  - Provide assistive devices as appropriate  - Consider OT/PT consult to assist with strengthening/ mobilization and activity  - Obtain nutritional consult as needed  - Establish a toileting routine/schedule  - Consider collaborating with pharmacy to review patient's medication profile  Outcome: Progressing  Goal: Maintains adequate nutritional intake (u

## 2021-07-02 NOTE — DISCHARGE SUMMARY
[facility]  Discharge Summary    Arminda Garcia Patient Status:  Inpatient    1955 MRN [id number]   [facility] 3NW-A Attending Kath Bradford MD   [address] Day # 4 PCP Law Pena DO     Date of Admission: 2021 ligation. Patient denies any chronic medical conditions. The patient's last colonoscopy was in 2018 and was found to have diverticulosis.     The patient states her family history is extensive for diverticulitis.   The patient states she has had 4 people

## 2021-07-02 NOTE — PLAN OF CARE
A&O x4. VSS on RA. Midline incision JS, staples CDI. Mild bruising to abdomen. Reports mild pain to abdomen. Scheduled Tylenol given. IV Zosyn q8 infusing. Tolerating soft diet. Colostomy with stool and gas. Ambulating independently.  Voiding without diffi

## 2021-07-06 ENCOUNTER — TELEPHONE (OUTPATIENT)
Dept: WOUND CARE | Facility: HOSPITAL | Age: 66
End: 2021-07-06

## 2021-07-06 NOTE — TELEPHONE ENCOUNTER
[facility]  Outpatient Wound Care Contact Note  Attempted to check in with patient to see if she has any questions or concerns regarding ostomy care. Left voicemail with office number for the patient to call back if any concerns or questions.     Victor Manuel Argueta

## 2021-07-12 ENCOUNTER — TELEPHONE (OUTPATIENT)
Dept: WOUND CARE | Facility: HOSPITAL | Age: 66
End: 2021-07-12

## 2021-07-12 ENCOUNTER — OFFICE VISIT (OUTPATIENT)
Dept: SURGERY | Facility: CLINIC | Age: 66
End: 2021-07-12

## 2021-07-12 VITALS
BODY MASS INDEX: 27.79 KG/M2 | WEIGHT: 187.63 LBS | TEMPERATURE: 98 F | HEIGHT: 69 IN | HEART RATE: 80 BPM | DIASTOLIC BLOOD PRESSURE: 75 MMHG | SYSTOLIC BLOOD PRESSURE: 107 MMHG

## 2021-07-12 DIAGNOSIS — Z90.49 S/P LAPAROSCOPIC COLECTOMY: Primary | ICD-10-CM

## 2021-07-12 DIAGNOSIS — Z93.9 HISTORY OF CREATION OF OSTOMY (HCC): ICD-10-CM

## 2021-07-12 PROCEDURE — 3074F SYST BP LT 130 MM HG: CPT | Performed by: STUDENT IN AN ORGANIZED HEALTH CARE EDUCATION/TRAINING PROGRAM

## 2021-07-12 PROCEDURE — 99024 POSTOP FOLLOW-UP VISIT: CPT | Performed by: STUDENT IN AN ORGANIZED HEALTH CARE EDUCATION/TRAINING PROGRAM

## 2021-07-12 PROCEDURE — 1111F DSCHRG MED/CURRENT MED MERGE: CPT | Performed by: STUDENT IN AN ORGANIZED HEALTH CARE EDUCATION/TRAINING PROGRAM

## 2021-07-12 PROCEDURE — 3008F BODY MASS INDEX DOCD: CPT | Performed by: STUDENT IN AN ORGANIZED HEALTH CARE EDUCATION/TRAINING PROGRAM

## 2021-07-12 PROCEDURE — 3078F DIAST BP <80 MM HG: CPT | Performed by: STUDENT IN AN ORGANIZED HEALTH CARE EDUCATION/TRAINING PROGRAM

## 2021-07-12 RX ORDER — CEPHALEXIN 250 MG/1
250 CAPSULE ORAL 2 TIMES DAILY
Qty: 28 CAPSULE | Refills: 0 | Status: SHIPPED | OUTPATIENT
Start: 2021-07-12 | End: 2021-07-26

## 2021-07-12 NOTE — PROGRESS NOTES
Post Operative Visit Note       Active Problems  1. S/P laparoscopic colectomy    2. History of creation of ostomy [facility])         Chief Complaint   Patient presents with:  Post-Op: 6/28 LAR . Sterling Cintron c/o Pt states no pain. No bleeding.   No diarrhea or constipation Spouse name: Not on file      Number of children: 4      Years of education: Not on file      Highest education level: Not on file    Tobacco Use      Smoking status: Never Smoker      Smokeless tobacco: Never Used    Vaping Use      Vaping Use: Never us and rash. Neurological: Negative for tremors, syncope and weakness. Hematological: Negative for adenopathy. Does not bruise/bleed easily. Psychiatric/Behavioral: Negative for behavioral problems and sleep disturbance.        Physical Findings    staples without difficulty today. I placed steri-strips over the midline incision for enhanced reinforcement. • I prescribed Keflex 250 mg BID for 14 days at patient's request and concern for secondary infection with upcoming travel.      • Continue osto

## 2021-07-12 NOTE — TELEPHONE ENCOUNTER
Returned call to patient. She states she has some questions regarding products. Discussed and answered the patient's questions. She states she understands and will call back if she has more questions.      Shana MICHAELN RN  Wound Care  7/12/2021 13:05

## 2021-08-04 ENCOUNTER — OFFICE VISIT (OUTPATIENT)
Dept: SURGERY | Facility: CLINIC | Age: 66
End: 2021-08-04

## 2021-08-04 VITALS
WEIGHT: 178 LBS | HEIGHT: 69 IN | HEART RATE: 76 BPM | BODY MASS INDEX: 26.36 KG/M2 | SYSTOLIC BLOOD PRESSURE: 111 MMHG | DIASTOLIC BLOOD PRESSURE: 66 MMHG | TEMPERATURE: 98 F

## 2021-08-04 DIAGNOSIS — Z90.49 S/P LAPAROSCOPIC COLECTOMY: ICD-10-CM

## 2021-08-04 DIAGNOSIS — K57.20 PERFORATION OF SIGMOID COLON DUE TO DIVERTICULITIS: ICD-10-CM

## 2021-08-04 DIAGNOSIS — Z93.9 HISTORY OF CREATION OF OSTOMY (HCC): Primary | ICD-10-CM

## 2021-08-04 PROCEDURE — 3074F SYST BP LT 130 MM HG: CPT | Performed by: SURGERY

## 2021-08-04 PROCEDURE — 3078F DIAST BP <80 MM HG: CPT | Performed by: SURGERY

## 2021-08-04 PROCEDURE — 99024 POSTOP FOLLOW-UP VISIT: CPT | Performed by: SURGERY

## 2021-08-04 PROCEDURE — 3008F BODY MASS INDEX DOCD: CPT | Performed by: SURGERY

## 2021-08-04 NOTE — PROGRESS NOTES
Post Operative Visit Note       Active Problems  1. History of creation of ostomy ([address]    2. Perforation of sigmoid colon due to diverticulitis    3.  S/P laparoscopic colectomy         Chief Complaint   Patient presents with:  Post-Op: PO - EXPLORATORY [place] name: Not on file      Number of children: 4      Years of education: Not on file      Highest education level: Not on file    Tobacco Use      Smoking status: Never Smoker      Smokeless tobacco: Never Used    Vaping Use      Vaping Use: Never used    Sub syncope and weakness. Hematological: Negative for adenopathy. Does not bruise/bleed easily. Psychiatric/Behavioral: Negative for behavioral problems and sleep disturbance.        Physical Findings   /66   Pulse 76   Temp 97.8 °F (36.6 °C)   Ht 69\ planning.       Marii Bustamante MD

## 2021-08-17 RX ORDER — LEVOTHYROXINE SODIUM 0.05 MG/1
TABLET ORAL
Qty: 90 TABLET | Refills: 0 | Status: SHIPPED | OUTPATIENT
Start: 2021-08-17 | End: 2021-11-27

## 2021-11-27 ENCOUNTER — PATIENT MESSAGE (OUTPATIENT)
Dept: FAMILY MEDICINE CLINIC | Facility: CLINIC | Age: 66
End: 2021-11-27

## 2021-11-27 RX ORDER — LEVOTHYROXINE SODIUM 0.05 MG/1
TABLET ORAL
Qty: 30 TABLET | Refills: 0 | Status: SHIPPED | OUTPATIENT
Start: 2021-11-27 | End: 2021-11-30

## 2021-11-29 NOTE — TELEPHONE ENCOUNTER
From: Abdiel Leahy NP  To: Korin Mijares  Sent: 11/27/2021 2:25 PM CST  Subject: overdue thyroid labs    Jorden Mcmillan Monday,  It looks like a lot has happened since I last saw you in June. I hope you'll be back to your smiling self soon!   I'll ne

## 2021-11-30 ENCOUNTER — LAB ENCOUNTER (OUTPATIENT)
Dept: LAB | Age: 66
End: 2021-11-30
Attending: INTERNAL MEDICINE
Payer: COMMERCIAL

## 2021-11-30 DIAGNOSIS — E03.9 ACQUIRED HYPOTHYROIDISM: ICD-10-CM

## 2021-11-30 PROCEDURE — 84443 ASSAY THYROID STIM HORMONE: CPT | Performed by: INTERNAL MEDICINE

## 2021-11-30 PROCEDURE — 84439 ASSAY OF FREE THYROXINE: CPT | Performed by: INTERNAL MEDICINE

## 2021-11-30 RX ORDER — LEVOTHYROXINE SODIUM 0.05 MG/1
TABLET ORAL
Qty: 90 TABLET | Refills: 0 | Status: SHIPPED | OUTPATIENT
Start: 2021-11-30 | End: 2021-12-03

## 2021-12-03 RX ORDER — LEVOTHYROXINE SODIUM 0.05 MG/1
50 TABLET ORAL
Qty: 90 TABLET | Refills: 3 | Status: SHIPPED | OUTPATIENT
Start: 2021-12-03

## 2022-07-25 ENCOUNTER — TELEPHONE (OUTPATIENT)
Dept: FAMILY MEDICINE CLINIC | Facility: CLINIC | Age: 67
End: 2022-07-25

## 2022-07-25 DIAGNOSIS — E03.9 ACQUIRED HYPOTHYROIDISM: ICD-10-CM

## 2022-07-25 DIAGNOSIS — Z12.31 ENCOUNTER FOR SCREENING MAMMOGRAM FOR BREAST CANCER: Primary | ICD-10-CM

## 2022-07-25 NOTE — TELEPHONE ENCOUNTER
She is suppose to get thyroid labs done to be able to get a refill. She wanted to get them done today. She was told she needed to get them done in order to get the refill.

## 2022-07-26 ENCOUNTER — LAB ENCOUNTER (OUTPATIENT)
Dept: LAB | Age: 67
End: 2022-07-26
Attending: INTERNAL MEDICINE
Payer: COMMERCIAL

## 2022-07-26 DIAGNOSIS — E03.9 ACQUIRED HYPOTHYROIDISM: ICD-10-CM

## 2022-07-26 LAB
T4 FREE SERPL-MCNC: 1 NG/DL (ref 0.8–1.7)
TSI SER-ACNC: 2.22 MIU/ML (ref 0.36–3.74)

## 2022-07-26 PROCEDURE — 84443 ASSAY THYROID STIM HORMONE: CPT | Performed by: INTERNAL MEDICINE

## 2022-07-26 PROCEDURE — 84439 ASSAY OF FREE THYROXINE: CPT | Performed by: INTERNAL MEDICINE

## 2022-08-04 RX ORDER — LEVOTHYROXINE SODIUM 0.05 MG/1
50 TABLET ORAL
Qty: 90 TABLET | Refills: 0 | Status: SHIPPED | OUTPATIENT
Start: 2022-08-04

## 2022-09-13 NOTE — CONSULTS
[facility]  Report of Inpatient Ostomy Consultation     Sunita Ramírez Patient Status:  Inpatient    1955 MRN [id number]   [facility] 3NW-A 310/310-A Attending Brad Benton MD   [facility] Day # 2 SARAH Gipson Dear,  yes    Overnight drainage  yes    Diet  yes    Emergency supplies  yes    [employer] referral/Secure start program registration yes    Purchasing supplies  yes        Teaching tools used    Video yes    Learning packet   yes    Demonstration  yes    Return [place] 0

## 2022-10-24 ENCOUNTER — HOSPITAL ENCOUNTER (OUTPATIENT)
Dept: MAMMOGRAPHY | Age: 67
Discharge: HOME | End: 2022-10-24
Attending: INTERNAL MEDICINE
Payer: COMMERCIAL

## 2022-10-24 ENCOUNTER — OFFICE VISIT (OUTPATIENT)
Dept: FAMILY MEDICINE CLINIC | Facility: CLINIC | Age: 67
End: 2022-10-24
Payer: COMMERCIAL

## 2022-10-24 VITALS
SYSTOLIC BLOOD PRESSURE: 118 MMHG | BODY MASS INDEX: 26.66 KG/M2 | HEIGHT: 69 IN | OXYGEN SATURATION: 98 % | HEART RATE: 62 BPM | RESPIRATION RATE: 16 BRPM | DIASTOLIC BLOOD PRESSURE: 82 MMHG | WEIGHT: 180 LBS

## 2022-10-24 DIAGNOSIS — E03.9 ACQUIRED HYPOTHYROIDISM: ICD-10-CM

## 2022-10-24 DIAGNOSIS — Z13.820 ENCOUNTER FOR OSTEOPOROSIS SCREENING IN ASYMPTOMATIC POSTMENOPAUSAL PATIENT: ICD-10-CM

## 2022-10-24 DIAGNOSIS — Z00.00 ROUTINE GENERAL MEDICAL EXAMINATION AT A HEALTH CARE FACILITY: Primary | ICD-10-CM

## 2022-10-24 DIAGNOSIS — Z12.31 ENCOUNTER FOR SCREENING MAMMOGRAM FOR BREAST CANCER: ICD-10-CM

## 2022-10-24 DIAGNOSIS — E04.1 THYROID NODULE: ICD-10-CM

## 2022-10-24 DIAGNOSIS — Z78.0 ENCOUNTER FOR OSTEOPOROSIS SCREENING IN ASYMPTOMATIC POSTMENOPAUSAL PATIENT: ICD-10-CM

## 2022-10-24 PROCEDURE — 90662 IIV NO PRSV INCREASED AG IM: CPT | Performed by: PHYSICIAN ASSISTANT

## 2022-10-24 PROCEDURE — 3074F SYST BP LT 130 MM HG: CPT | Performed by: PHYSICIAN ASSISTANT

## 2022-10-24 PROCEDURE — 77067 SCR MAMMO BI INCL CAD: CPT | Performed by: INTERNAL MEDICINE

## 2022-10-24 PROCEDURE — 3079F DIAST BP 80-89 MM HG: CPT | Performed by: PHYSICIAN ASSISTANT

## 2022-10-24 PROCEDURE — 99397 PER PM REEVAL EST PAT 65+ YR: CPT | Performed by: PHYSICIAN ASSISTANT

## 2022-10-24 PROCEDURE — 3008F BODY MASS INDEX DOCD: CPT | Performed by: PHYSICIAN ASSISTANT

## 2022-10-24 PROCEDURE — 90471 IMMUNIZATION ADMIN: CPT | Performed by: PHYSICIAN ASSISTANT

## 2022-10-24 PROCEDURE — 77063 BREAST TOMOSYNTHESIS BI: CPT | Performed by: INTERNAL MEDICINE

## 2022-10-24 RX ORDER — LEVOTHYROXINE SODIUM 0.05 MG/1
50 TABLET ORAL
Qty: 90 TABLET | Refills: 3 | Status: SHIPPED | OUTPATIENT
Start: 2022-10-24

## 2022-10-31 ENCOUNTER — LAB ENCOUNTER (OUTPATIENT)
Dept: LAB | Age: 67
End: 2022-10-31
Attending: PHYSICIAN ASSISTANT
Payer: COMMERCIAL

## 2022-10-31 DIAGNOSIS — Z00.00 ROUTINE GENERAL MEDICAL EXAMINATION AT A HEALTH CARE FACILITY: ICD-10-CM

## 2022-10-31 DIAGNOSIS — E03.9 ACQUIRED HYPOTHYROIDISM: ICD-10-CM

## 2022-10-31 LAB
ALBUMIN SERPL-MCNC: 3.4 G/DL (ref 3.4–5)
ALBUMIN/GLOB SERPL: 1 {RATIO} (ref 1–2)
ALP LIVER SERPL-CCNC: 80 U/L
ALT SERPL-CCNC: 21 U/L
ANION GAP SERPL CALC-SCNC: 2 MMOL/L (ref 0–18)
AST SERPL-CCNC: 17 U/L (ref 15–37)
BASOPHILS # BLD AUTO: 0.03 X10(3) UL (ref 0–0.2)
BASOPHILS NFR BLD AUTO: 0.5 %
BILIRUB SERPL-MCNC: 0.6 MG/DL (ref 0.1–2)
BUN BLD-MCNC: 16 MG/DL (ref 7–18)
CALCIUM BLD-MCNC: 8.7 MG/DL (ref 8.5–10.1)
CHLORIDE SERPL-SCNC: 111 MMOL/L (ref 98–112)
CHOLEST SERPL-MCNC: 217 MG/DL (ref ?–200)
CO2 SERPL-SCNC: 28 MMOL/L (ref 21–32)
CREAT BLD-MCNC: 0.91 MG/DL
EOSINOPHIL # BLD AUTO: 0.09 X10(3) UL (ref 0–0.7)
EOSINOPHIL NFR BLD AUTO: 1.6 %
ERYTHROCYTE [DISTWIDTH] IN BLOOD BY AUTOMATED COUNT: 13.4 %
FASTING PATIENT LIPID ANSWER: YES
FASTING STATUS PATIENT QL REPORTED: YES
GFR SERPLBLD BASED ON 1.73 SQ M-ARVRAT: 69 ML/MIN/1.73M2 (ref 60–?)
GLOBULIN PLAS-MCNC: 3.4 G/DL (ref 2.8–4.4)
GLUCOSE BLD-MCNC: 86 MG/DL (ref 70–99)
HCT VFR BLD AUTO: 40.8 %
HDLC SERPL-MCNC: 93 MG/DL (ref 40–59)
HGB BLD-MCNC: 13 G/DL
IMM GRANULOCYTES # BLD AUTO: 0.01 X10(3) UL (ref 0–1)
IMM GRANULOCYTES NFR BLD: 0.2 %
LDLC SERPL CALC-MCNC: 115 MG/DL (ref ?–100)
LYMPHOCYTES # BLD AUTO: 2.52 X10(3) UL (ref 1–4)
LYMPHOCYTES NFR BLD AUTO: 45.5 %
MCH RBC QN AUTO: 32.7 PG (ref 26–34)
MCHC RBC AUTO-ENTMCNC: 31.9 G/DL (ref 31–37)
MCV RBC AUTO: 102.5 FL
MONOCYTES # BLD AUTO: 0.52 X10(3) UL (ref 0.1–1)
MONOCYTES NFR BLD AUTO: 9.4 %
NEUTROPHILS # BLD AUTO: 2.37 X10 (3) UL (ref 1.5–7.7)
NEUTROPHILS # BLD AUTO: 2.37 X10(3) UL (ref 1.5–7.7)
NEUTROPHILS NFR BLD AUTO: 42.8 %
NONHDLC SERPL-MCNC: 124 MG/DL (ref ?–130)
OSMOLALITY SERPL CALC.SUM OF ELEC: 292 MOSM/KG (ref 275–295)
PLATELET # BLD AUTO: 253 10(3)UL (ref 150–450)
POTASSIUM SERPL-SCNC: 4.1 MMOL/L (ref 3.5–5.1)
PROT SERPL-MCNC: 6.8 G/DL (ref 6.4–8.2)
RBC # BLD AUTO: 3.98 X10(6)UL
SODIUM SERPL-SCNC: 141 MMOL/L (ref 136–145)
T4 FREE SERPL-MCNC: 1 NG/DL (ref 0.8–1.7)
TRIGL SERPL-MCNC: 48 MG/DL (ref 30–149)
TSI SER-ACNC: 3.89 MIU/ML (ref 0.36–3.74)
VIT B12 SERPL-MCNC: 479 PG/ML (ref 193–986)
VIT D+METAB SERPL-MCNC: 55.8 NG/ML (ref 30–100)
VLDLC SERPL CALC-MCNC: 8 MG/DL (ref 0–30)
WBC # BLD AUTO: 5.5 X10(3) UL (ref 4–11)

## 2022-10-31 PROCEDURE — 80061 LIPID PANEL: CPT | Performed by: PHYSICIAN ASSISTANT

## 2022-10-31 PROCEDURE — 80050 GENERAL HEALTH PANEL: CPT | Performed by: PHYSICIAN ASSISTANT

## 2022-10-31 PROCEDURE — 84439 ASSAY OF FREE THYROXINE: CPT | Performed by: PHYSICIAN ASSISTANT

## 2022-10-31 PROCEDURE — 82607 VITAMIN B-12: CPT | Performed by: PHYSICIAN ASSISTANT

## 2022-10-31 PROCEDURE — 82306 VITAMIN D 25 HYDROXY: CPT | Performed by: PHYSICIAN ASSISTANT

## 2022-11-07 ENCOUNTER — TELEPHONE (OUTPATIENT)
Dept: FAMILY MEDICINE CLINIC | Facility: CLINIC | Age: 67
End: 2022-11-07

## 2022-11-07 DIAGNOSIS — E03.9 HYPOTHYROIDISM, UNSPECIFIED TYPE: Primary | ICD-10-CM

## 2022-11-07 RX ORDER — LEVOTHYROXINE SODIUM 0.07 MG/1
75 TABLET ORAL
Qty: 90 TABLET | Refills: 0 | Status: SHIPPED | OUTPATIENT
Start: 2022-11-07

## 2022-11-07 NOTE — TELEPHONE ENCOUNTER
Pt said she received notification that due to her latest thyroid levels (10/31) her medication is changing from 50mcg to 75mcg - She will need a new script.

## 2022-12-27 DIAGNOSIS — E03.9 HYPOTHYROIDISM, UNSPECIFIED TYPE: ICD-10-CM

## 2022-12-27 RX ORDER — LEVOTHYROXINE SODIUM 0.07 MG/1
TABLET ORAL
Qty: 90 TABLET | Refills: 0 | Status: SHIPPED | OUTPATIENT
Start: 2022-12-27

## 2023-02-16 ENCOUNTER — OFFICE VISIT (OUTPATIENT)
Dept: FAMILY MEDICINE CLINIC | Facility: CLINIC | Age: 68
End: 2023-02-16
Payer: COMMERCIAL

## 2023-02-16 VITALS
BODY MASS INDEX: 25.48 KG/M2 | OXYGEN SATURATION: 98 % | DIASTOLIC BLOOD PRESSURE: 76 MMHG | HEIGHT: 70 IN | TEMPERATURE: 98 F | RESPIRATION RATE: 16 BRPM | HEART RATE: 61 BPM | SYSTOLIC BLOOD PRESSURE: 110 MMHG | WEIGHT: 178 LBS

## 2023-02-16 DIAGNOSIS — J98.01 BRONCHOSPASM: Primary | ICD-10-CM

## 2023-02-16 PROCEDURE — 3078F DIAST BP <80 MM HG: CPT | Performed by: NURSE PRACTITIONER

## 2023-02-16 PROCEDURE — 99213 OFFICE O/P EST LOW 20 MIN: CPT | Performed by: NURSE PRACTITIONER

## 2023-02-16 PROCEDURE — 3074F SYST BP LT 130 MM HG: CPT | Performed by: NURSE PRACTITIONER

## 2023-02-16 PROCEDURE — 3008F BODY MASS INDEX DOCD: CPT | Performed by: NURSE PRACTITIONER

## 2023-02-16 RX ORDER — BENZONATATE 100 MG/1
100 CAPSULE ORAL 3 TIMES DAILY PRN
Qty: 30 CAPSULE | Refills: 0 | Status: SHIPPED | OUTPATIENT
Start: 2023-02-16

## 2023-02-16 RX ORDER — METHYLPREDNISOLONE 4 MG/1
TABLET ORAL
Qty: 1 EACH | Refills: 0 | Status: SHIPPED | OUTPATIENT
Start: 2023-02-16

## 2023-02-16 RX ORDER — ALBUTEROL SULFATE 90 UG/1
AEROSOL, METERED RESPIRATORY (INHALATION)
Qty: 1 EACH | Refills: 0 | Status: SHIPPED | OUTPATIENT
Start: 2023-02-16

## 2023-03-22 DIAGNOSIS — E03.9 HYPOTHYROIDISM, UNSPECIFIED TYPE: ICD-10-CM

## 2023-03-22 RX ORDER — LEVOTHYROXINE SODIUM 0.07 MG/1
TABLET ORAL
Qty: 90 TABLET | Refills: 0 | Status: SHIPPED | OUTPATIENT
Start: 2023-03-22

## 2023-03-23 ENCOUNTER — PATIENT MESSAGE (OUTPATIENT)
Dept: FAMILY MEDICINE CLINIC | Facility: CLINIC | Age: 68
End: 2023-03-23

## 2023-03-23 NOTE — TELEPHONE ENCOUNTER
From: Meredith Hernandez  To: Bashir Jones NP  Sent: 3/23/2023 2:10 PM CDT  Subject: Thyroid bloodwork - next blood test?    Thyroid bloodwork.   When should I get my next blood test?  Estelle Slider

## 2023-03-27 ENCOUNTER — HOSPITAL ENCOUNTER (OUTPATIENT)
Dept: BONE DENSITY | Age: 68
Discharge: HOME | End: 2023-03-27
Attending: PHYSICIAN ASSISTANT
Payer: COMMERCIAL

## 2023-03-27 DIAGNOSIS — Z13.820 ENCOUNTER FOR OSTEOPOROSIS SCREENING IN ASYMPTOMATIC POSTMENOPAUSAL PATIENT: ICD-10-CM

## 2023-03-27 DIAGNOSIS — Z78.0 ENCOUNTER FOR OSTEOPOROSIS SCREENING IN ASYMPTOMATIC POSTMENOPAUSAL PATIENT: ICD-10-CM

## 2023-03-27 PROCEDURE — 77080 DXA BONE DENSITY AXIAL: CPT | Performed by: PHYSICIAN ASSISTANT

## 2023-06-06 ENCOUNTER — TELEPHONE (OUTPATIENT)
Facility: LOCATION | Age: 68
End: 2023-06-06

## 2023-06-06 DIAGNOSIS — E04.2 MULTIPLE THYROID NODULES: Primary | ICD-10-CM

## 2023-06-06 NOTE — TELEPHONE ENCOUNTER
Patient has appt on 7/6 but would like to know if she should get thyroid ultrasound before appointment.

## 2023-06-07 ENCOUNTER — LAB ENCOUNTER (OUTPATIENT)
Dept: LAB | Age: 68
End: 2023-06-07
Attending: PHYSICIAN ASSISTANT
Payer: COMMERCIAL

## 2023-06-07 DIAGNOSIS — E03.9 HYPOTHYROIDISM, UNSPECIFIED TYPE: ICD-10-CM

## 2023-06-07 LAB
T4 FREE SERPL-MCNC: 1.2 NG/DL (ref 0.8–1.7)
TSI SER-ACNC: 2.02 MIU/ML (ref 0.36–3.74)

## 2023-06-07 PROCEDURE — 84443 ASSAY THYROID STIM HORMONE: CPT | Performed by: PHYSICIAN ASSISTANT

## 2023-06-07 PROCEDURE — 84439 ASSAY OF FREE THYROXINE: CPT | Performed by: PHYSICIAN ASSISTANT

## 2023-06-23 ENCOUNTER — HOSPITAL ENCOUNTER (OUTPATIENT)
Dept: ULTRASOUND IMAGING | Age: 68
Discharge: HOME | End: 2023-06-23
Attending: PHYSICIAN ASSISTANT
Payer: COMMERCIAL

## 2023-06-23 DIAGNOSIS — E03.9 ACQUIRED HYPOTHYROIDISM: ICD-10-CM

## 2023-06-23 DIAGNOSIS — Z00.00 ROUTINE GENERAL MEDICAL EXAMINATION AT A HEALTH CARE FACILITY: ICD-10-CM

## 2023-06-23 PROCEDURE — 76536 US EXAM OF HEAD AND NECK: CPT | Performed by: PHYSICIAN ASSISTANT

## 2023-06-30 DIAGNOSIS — E03.9 HYPOTHYROIDISM, UNSPECIFIED TYPE: ICD-10-CM

## 2023-06-30 RX ORDER — LEVOTHYROXINE SODIUM 0.07 MG/1
75 TABLET ORAL
Qty: 90 TABLET | Refills: 0 | Status: SHIPPED | OUTPATIENT
Start: 2023-06-30

## 2023-09-26 DIAGNOSIS — E03.9 HYPOTHYROIDISM, UNSPECIFIED TYPE: ICD-10-CM

## 2023-09-26 RX ORDER — LEVOTHYROXINE SODIUM 0.07 MG/1
75 TABLET ORAL
Qty: 90 TABLET | Refills: 0 | Status: SHIPPED | OUTPATIENT
Start: 2023-09-26

## 2023-10-27 DIAGNOSIS — E03.9 ACQUIRED HYPOTHYROIDISM: Primary | ICD-10-CM

## 2023-12-26 DIAGNOSIS — E03.9 HYPOTHYROIDISM, UNSPECIFIED TYPE: ICD-10-CM

## 2023-12-26 RX ORDER — LEVOTHYROXINE SODIUM 0.07 MG/1
75 TABLET ORAL
Qty: 90 TABLET | Refills: 0 | OUTPATIENT
Start: 2023-12-26

## 2024-01-02 ENCOUNTER — TELEPHONE (OUTPATIENT)
Dept: FAMILY MEDICINE CLINIC | Facility: CLINIC | Age: 69
End: 2024-01-02

## 2024-01-02 DIAGNOSIS — E03.9 HYPOTHYROIDISM, UNSPECIFIED TYPE: ICD-10-CM

## 2024-01-02 RX ORDER — LEVOTHYROXINE SODIUM 0.07 MG/1
75 TABLET ORAL
Qty: 30 TABLET | Refills: 0 | Status: SHIPPED | OUTPATIENT
Start: 2024-01-02 | End: 2024-02-01

## 2024-01-17 ENCOUNTER — OFFICE VISIT (OUTPATIENT)
Dept: FAMILY MEDICINE CLINIC | Facility: CLINIC | Age: 69
End: 2024-01-17
Payer: COMMERCIAL

## 2024-01-17 VITALS — HEART RATE: 72 BPM | HEIGHT: 70 IN | WEIGHT: 173 LBS | BODY MASS INDEX: 24.77 KG/M2

## 2024-01-17 DIAGNOSIS — E03.9 HYPOTHYROIDISM, UNSPECIFIED TYPE: ICD-10-CM

## 2024-01-17 DIAGNOSIS — Z00.00 ROUTINE GENERAL MEDICAL EXAMINATION AT A HEALTH CARE FACILITY: Primary | ICD-10-CM

## 2024-01-17 DIAGNOSIS — Z12.31 VISIT FOR SCREENING MAMMOGRAM: ICD-10-CM

## 2024-01-17 DIAGNOSIS — E04.1 THYROID NODULE: ICD-10-CM

## 2024-01-17 DIAGNOSIS — N95.1 VAGINAL DRYNESS, MENOPAUSAL: ICD-10-CM

## 2024-01-17 PROCEDURE — 99397 PER PM REEVAL EST PAT 65+ YR: CPT | Performed by: PHYSICIAN ASSISTANT

## 2024-01-17 PROCEDURE — 3008F BODY MASS INDEX DOCD: CPT | Performed by: PHYSICIAN ASSISTANT

## 2024-01-17 RX ORDER — LEVOTHYROXINE SODIUM 0.07 MG/1
75 TABLET ORAL
Qty: 90 TABLET | Refills: 3 | Status: SHIPPED | OUTPATIENT
Start: 2024-01-17

## 2024-01-17 RX ORDER — HYDROCODONE BITARTRATE AND ACETAMINOPHEN 5; 325 MG/1; MG/1
1 TABLET ORAL EVERY 6 HOURS PRN
COMMUNITY
Start: 2023-08-24 | End: 2024-01-17

## 2024-01-17 RX ORDER — ONDANSETRON 4 MG/1
TABLET, FILM COATED ORAL
COMMUNITY
Start: 2023-08-24 | End: 2024-01-17

## 2024-01-17 RX ORDER — ESTRADIOL 0.1 MG/G
CREAM VAGINAL
Qty: 42.5 G | Refills: 3 | Status: SHIPPED | OUTPATIENT
Start: 2024-01-17

## 2024-01-17 NOTE — PROGRESS NOTES
Subjective:   Patient ID: Aliya Peraza is a 68 year old female.    HPI  Patient presents today requesting physical exam.      Diet: balanced  Exercise: staying active  Tobacco use: denies  Drug use: denies  Alcohol use: socially  Marital status:   Occupation: [employer]      Health maintenance:  Pap/Hpv: 11/30/20 negative  Postmenopausal - using estrogen cream for vaginal dryness, needs refill  Mammogram: 10/24/22 stable  Performs SBEs: yes  Dexa scan: 3/27/23 osteopenia  Colonoscopy: 6/19/18, recall 5 years. H/o diverticulosis - had perforation of sigmoid colon, s/p colectomy and ostomy with subsequent take down  Discussed age appropriate vaccines    H/o thyroid nodules  Due for thyroid ultrasound    History/Other:   Review of Systems   Constitutional:  Negative for chills, fatigue and fever.   HENT:  Negative for congestion, ear pain, rhinorrhea and sore throat.    Eyes:  Negative for visual disturbance.   Respiratory:  Negative for cough, shortness of breath and wheezing.    Cardiovascular:  Negative for chest pain, palpitations and leg swelling.   Gastrointestinal:  Negative for abdominal pain, diarrhea, nausea and vomiting.   Genitourinary:  Negative for dysuria, frequency and hematuria.   Musculoskeletal:  Negative for arthralgias, gait problem and myalgias.   Skin:  Negative for rash.   Neurological:  Negative for weakness, light-headedness and headaches.   Hematological:  Negative for adenopathy.   Psychiatric/Behavioral:  Negative for dysphoric mood. The patient is not nervous/anxious.      Current Outpatient Medications   Medication Sig Dispense Refill    levothyroxine 75 MCG Oral Tab Take 1 tablet (75 mcg total) by mouth before breakfast. 30 tablet 0    albuterol 108 (90 Base) MCG/ACT Inhalation Aero Soln 2 puffs 3 times per day for 1 week, then as needed for 1 week. 1 each 0    benzonatate 100 MG Oral Cap Take 1 capsule (100 mg total) by mouth 3 (three) times daily as needed for  cough. Three times daily as needed for cough. 30 capsule 0    Diclofenac Sodium 1 % External Gel Apply 2 g topically 4 (four) times daily. 3 each 0    Estradiol 0.1 MG/GM Vaginal Cream 1 gm PV 3 times a week 42.5 g 3    Multiple Vitamins-Minerals (MULTIVITAMIN OR) Take  by mouth.      Fiber Oral Chew Tab Chew by mouth.       Allergies:No Known Allergies    Objective:   Physical Exam  Vitals and nursing note reviewed.   Constitutional:       General: She is not in acute distress.     Appearance: Normal appearance. She is well-developed.   HENT:      Head: Normocephalic and atraumatic.      Right Ear: Tympanic membrane, ear canal and external ear normal.      Left Ear: Tympanic membrane, ear canal and external ear normal.      Nose: Nose normal.      Mouth/Throat:      Mouth: Mucous membranes are moist.   Eyes:      Extraocular Movements: Extraocular movements intact.      Conjunctiva/sclera: Conjunctivae normal.      Pupils: Pupils are equal, round, and reactive to light.   Neck:      Thyroid: Thyromegaly (left more than right lobe) present.   Cardiovascular:      Rate and Rhythm: Normal rate and regular rhythm.      Pulses: Normal pulses.      Heart sounds: Normal heart sounds.   Pulmonary:      Effort: Pulmonary effort is normal.      Breath sounds: Normal breath sounds. No wheezing or rales.   Chest:   Breasts:     Right: No swelling, bleeding, inverted nipple, mass, nipple discharge, skin change or tenderness.      Left: No swelling, bleeding, inverted nipple, mass, nipple discharge, skin change or tenderness.   Abdominal:      General: Bowel sounds are normal. There is no distension.      Palpations: Abdomen is soft. There is no mass.      Tenderness: There is no abdominal tenderness.   Musculoskeletal:         General: No tenderness. Normal range of motion.      Cervical back: Normal range of motion and neck supple.   Lymphadenopathy:      Cervical: No cervical adenopathy.   Skin:     General: Skin is warm and  dry.      Findings: No rash.   Neurological:      General: No focal deficit present.      Mental Status: She is alert and oriented to person, place, and time.   Psychiatric:         Mood and Affect: Mood normal.         Behavior: Behavior normal.         Assessment & Plan:   1. Routine general medical examination at a health care facility  Patient is generally healthy.  Physical exam is unremarkable.  Check fasting labs.  Health maintenance issues discussed. Encouraged routine vaccines.  Advised healthy diet and regular exercise.  Annual physicals.  - CBC With Differential With Platelet; Future  - Comp Metabolic Panel (14); Future  - Lipid Panel; Future  - TSH W Reflex To Free T4; Future  - Vitamin D; Future  - Vitamin B12; Future    2. Hypothyroidism, unspecified type  TSH in June 2023 stable. Continue same dose of LT4 for now. Recheck labs this summer.   - levothyroxine 75 MCG Oral Tab; Take 1 tablet (75 mcg total) by mouth before breakfast.  Dispense: 90 tablet; Refill: 3    3. Vaginal dryness, menopausal  Stable. Cpm. Refills given.   - estradiol 0.1 MG/GM Vaginal Cream; 1 gm PV 3 times a week  Dispense: 42.5 g; Refill: 3    4. Thyroid nodule  Repeat thyroid ultrasound and refer to ENT if needed.   - US THYROID (CPT=76536); Future    5. Visit for screening mammogram  - [facility] MILIND 2D+3D SCREENING BILAT (CPT=77067/94217); Future

## 2024-02-09 DIAGNOSIS — E03.9 HYPOTHYROIDISM, UNSPECIFIED TYPE: ICD-10-CM

## 2024-02-09 RX ORDER — LEVOTHYROXINE SODIUM 0.07 MG/1
75 TABLET ORAL
Qty: 90 TABLET | Refills: 3 | Status: SHIPPED | OUTPATIENT
Start: 2024-02-09

## 2024-02-23 ENCOUNTER — LAB ENCOUNTER (OUTPATIENT)
Dept: LAB | Age: 69
End: 2024-02-23
Attending: PHYSICIAN ASSISTANT
Payer: COMMERCIAL

## 2024-02-23 DIAGNOSIS — Z00.00 ROUTINE GENERAL MEDICAL EXAMINATION AT A HEALTH CARE FACILITY: ICD-10-CM

## 2024-02-23 DIAGNOSIS — E03.9 ACQUIRED HYPOTHYROIDISM: ICD-10-CM

## 2024-02-23 LAB
ALBUMIN SERPL-MCNC: 3.6 G/DL (ref 3.4–5)
ALBUMIN/GLOB SERPL: 1.1 {RATIO} (ref 1–2)
ALP LIVER SERPL-CCNC: 91 U/L
ALT SERPL-CCNC: 18 U/L
ANION GAP SERPL CALC-SCNC: 1 MMOL/L (ref 0–18)
AST SERPL-CCNC: 16 U/L (ref 15–37)
BASOPHILS # BLD AUTO: 0.03 X10(3) UL (ref 0–0.2)
BASOPHILS NFR BLD AUTO: 0.5 %
BILIRUB SERPL-MCNC: 0.6 MG/DL (ref 0.1–2)
BUN BLD-MCNC: 15 MG/DL (ref 9–23)
CALCIUM BLD-MCNC: 9.5 MG/DL (ref 8.5–10.1)
CHLORIDE SERPL-SCNC: 109 MMOL/L (ref 98–112)
CHOLEST SERPL-MCNC: 235 MG/DL (ref ?–200)
CO2 SERPL-SCNC: 30 MMOL/L (ref 21–32)
CREAT BLD-MCNC: 0.92 MG/DL
EGFRCR SERPLBLD CKD-EPI 2021: 68 ML/MIN/1.73M2 (ref 60–?)
EOSINOPHIL # BLD AUTO: 0.08 X10(3) UL (ref 0–0.7)
EOSINOPHIL NFR BLD AUTO: 1.4 %
ERYTHROCYTE [DISTWIDTH] IN BLOOD BY AUTOMATED COUNT: 14 %
FASTING PATIENT LIPID ANSWER: YES
FASTING STATUS PATIENT QL REPORTED: YES
GLOBULIN PLAS-MCNC: 3.2 G/DL (ref 2.8–4.4)
GLUCOSE BLD-MCNC: 88 MG/DL (ref 70–99)
HCT VFR BLD AUTO: 41.8 %
HDLC SERPL-MCNC: 91 MG/DL (ref 40–59)
HGB BLD-MCNC: 13.6 G/DL
IMM GRANULOCYTES # BLD AUTO: 0.02 X10(3) UL (ref 0–1)
IMM GRANULOCYTES NFR BLD: 0.3 %
LDLC SERPL CALC-MCNC: 135 MG/DL (ref ?–100)
LYMPHOCYTES # BLD AUTO: 2.19 X10(3) UL (ref 1–4)
LYMPHOCYTES NFR BLD AUTO: 37.1 %
MCH RBC QN AUTO: 32.1 PG (ref 26–34)
MCHC RBC AUTO-ENTMCNC: 32.5 G/DL (ref 31–37)
MCV RBC AUTO: 98.6 FL
MONOCYTES # BLD AUTO: 0.53 X10(3) UL (ref 0.1–1)
MONOCYTES NFR BLD AUTO: 9 %
NEUTROPHILS # BLD AUTO: 3.05 X10 (3) UL (ref 1.5–7.7)
NEUTROPHILS # BLD AUTO: 3.05 X10(3) UL (ref 1.5–7.7)
NEUTROPHILS NFR BLD AUTO: 51.7 %
NONHDLC SERPL-MCNC: 144 MG/DL (ref ?–130)
OSMOLALITY SERPL CALC.SUM OF ELEC: 290 MOSM/KG (ref 275–295)
PLATELET # BLD AUTO: 286 10(3)UL (ref 150–450)
POTASSIUM SERPL-SCNC: 4.3 MMOL/L (ref 3.5–5.1)
PROT SERPL-MCNC: 6.8 G/DL (ref 6.4–8.2)
RBC # BLD AUTO: 4.24 X10(6)UL
SODIUM SERPL-SCNC: 140 MMOL/L (ref 136–145)
T4 FREE SERPL-MCNC: 1 NG/DL (ref 0.8–1.7)
TRIGL SERPL-MCNC: 54 MG/DL (ref 30–149)
TSI SER-ACNC: 2.75 MIU/ML (ref 0.36–3.74)
VIT B12 SERPL-MCNC: 661 PG/ML (ref 193–986)
VIT D+METAB SERPL-MCNC: 68.2 NG/ML (ref 30–100)
VLDLC SERPL CALC-MCNC: 10 MG/DL (ref 0–30)
WBC # BLD AUTO: 5.9 X10(3) UL (ref 4–11)

## 2024-02-23 PROCEDURE — 80061 LIPID PANEL: CPT | Performed by: PHYSICIAN ASSISTANT

## 2024-02-23 PROCEDURE — 80050 GENERAL HEALTH PANEL: CPT | Performed by: PHYSICIAN ASSISTANT

## 2024-02-23 PROCEDURE — 82306 VITAMIN D 25 HYDROXY: CPT | Performed by: PHYSICIAN ASSISTANT

## 2024-02-23 PROCEDURE — 84439 ASSAY OF FREE THYROXINE: CPT | Performed by: PHYSICIAN ASSISTANT

## 2024-02-23 PROCEDURE — 82607 VITAMIN B-12: CPT | Performed by: PHYSICIAN ASSISTANT

## 2024-04-08 ENCOUNTER — HOSPITAL ENCOUNTER (OUTPATIENT)
Dept: MAMMOGRAPHY | Age: 69
Discharge: HOME | End: 2024-04-08
Attending: PHYSICIAN ASSISTANT
Payer: COMMERCIAL

## 2024-04-08 DIAGNOSIS — Z12.31 VISIT FOR SCREENING MAMMOGRAM: ICD-10-CM

## 2024-04-08 PROCEDURE — 77063 BREAST TOMOSYNTHESIS BI: CPT | Performed by: PHYSICIAN ASSISTANT

## 2024-04-08 PROCEDURE — 77067 SCR MAMMO BI INCL CAD: CPT | Performed by: PHYSICIAN ASSISTANT

## 2025-01-08 DIAGNOSIS — Z13.21 ENCOUNTER FOR VITAMIN DEFICIENCY SCREENING: ICD-10-CM

## 2025-01-08 DIAGNOSIS — E78.00 ELEVATED CHOLESTEROL: ICD-10-CM

## 2025-01-08 DIAGNOSIS — Z00.00 ROUTINE GENERAL MEDICAL EXAMINATION AT A HEALTH CARE FACILITY: Primary | ICD-10-CM

## 2025-01-08 DIAGNOSIS — E03.9 HYPOTHYROIDISM, UNSPECIFIED TYPE: ICD-10-CM

## 2025-01-23 DIAGNOSIS — N95.1 VAGINAL DRYNESS, MENOPAUSAL: ICD-10-CM

## 2025-01-23 RX ORDER — ESTRADIOL 0.1 MG/G
CREAM VAGINAL
Qty: 42.5 G | Refills: 0 | OUTPATIENT
Start: 2025-01-23

## 2025-02-18 ENCOUNTER — PATIENT MESSAGE (OUTPATIENT)
Dept: FAMILY MEDICINE CLINIC | Facility: CLINIC | Age: 70
End: 2025-02-18

## 2025-02-18 ENCOUNTER — LAB ENCOUNTER (OUTPATIENT)
Dept: LAB | Age: 70
End: 2025-02-18
Attending: FAMILY MEDICINE
Payer: MEDICARE

## 2025-02-18 DIAGNOSIS — Z00.00 ROUTINE GENERAL MEDICAL EXAMINATION AT A HEALTH CARE FACILITY: ICD-10-CM

## 2025-02-18 DIAGNOSIS — E78.00 ELEVATED CHOLESTEROL: ICD-10-CM

## 2025-02-18 DIAGNOSIS — Z13.21 ENCOUNTER FOR VITAMIN DEFICIENCY SCREENING: ICD-10-CM

## 2025-02-18 DIAGNOSIS — E03.9 HYPOTHYROIDISM, UNSPECIFIED TYPE: ICD-10-CM

## 2025-02-18 LAB
ALBUMIN SERPL-MCNC: 4.5 G/DL (ref 3.2–4.8)
ALBUMIN/GLOB SERPL: 1.9 {RATIO} (ref 1–2)
ALP LIVER SERPL-CCNC: 85 U/L
ALT SERPL-CCNC: 16 U/L
ANION GAP SERPL CALC-SCNC: 8 MMOL/L (ref 0–18)
AST SERPL-CCNC: 24 U/L (ref ?–34)
BASOPHILS # BLD AUTO: 0.03 X10(3) UL (ref 0–0.2)
BASOPHILS NFR BLD AUTO: 0.6 %
BILIRUB SERPL-MCNC: 0.7 MG/DL (ref 0.2–1.1)
BUN BLD-MCNC: 17 MG/DL (ref 9–23)
CALCIUM BLD-MCNC: 9.8 MG/DL (ref 8.7–10.6)
CHLORIDE SERPL-SCNC: 106 MMOL/L (ref 98–112)
CHOLEST SERPL-MCNC: 222 MG/DL (ref ?–200)
CO2 SERPL-SCNC: 29 MMOL/L (ref 21–32)
CREAT BLD-MCNC: 0.98 MG/DL
EGFRCR SERPLBLD CKD-EPI 2021: 62 ML/MIN/1.73M2 (ref 60–?)
EOSINOPHIL # BLD AUTO: 0.09 X10(3) UL (ref 0–0.7)
EOSINOPHIL NFR BLD AUTO: 1.7 %
ERYTHROCYTE [DISTWIDTH] IN BLOOD BY AUTOMATED COUNT: 13.6 %
FASTING PATIENT LIPID ANSWER: YES
FASTING STATUS PATIENT QL REPORTED: YES
GLOBULIN PLAS-MCNC: 2.4 G/DL (ref 2–3.5)
GLUCOSE BLD-MCNC: 89 MG/DL (ref 70–99)
HCT VFR BLD AUTO: 43.9 %
HDLC SERPL-MCNC: 84 MG/DL (ref 40–59)
HGB BLD-MCNC: 13.9 G/DL
IMM GRANULOCYTES # BLD AUTO: 0.01 X10(3) UL (ref 0–1)
IMM GRANULOCYTES NFR BLD: 0.2 %
LDLC SERPL CALC-MCNC: 129 MG/DL (ref ?–100)
LYMPHOCYTES # BLD AUTO: 2.26 X10(3) UL (ref 1–4)
LYMPHOCYTES NFR BLD AUTO: 41.9 %
MCH RBC QN AUTO: 32 PG (ref 26–34)
MCHC RBC AUTO-ENTMCNC: 31.7 G/DL (ref 31–37)
MCV RBC AUTO: 100.9 FL
MONOCYTES # BLD AUTO: 0.6 X10(3) UL (ref 0.1–1)
MONOCYTES NFR BLD AUTO: 11.1 %
NEUTROPHILS # BLD AUTO: 2.4 X10 (3) UL (ref 1.5–7.7)
NEUTROPHILS # BLD AUTO: 2.4 X10(3) UL (ref 1.5–7.7)
NEUTROPHILS NFR BLD AUTO: 44.5 %
NONHDLC SERPL-MCNC: 138 MG/DL (ref ?–130)
OSMOLALITY SERPL CALC.SUM OF ELEC: 297 MOSM/KG (ref 275–295)
PLATELET # BLD AUTO: 259 10(3)UL (ref 150–450)
POTASSIUM SERPL-SCNC: 4.4 MMOL/L (ref 3.5–5.1)
PROT SERPL-MCNC: 6.9 G/DL (ref 5.7–8.2)
RBC # BLD AUTO: 4.35 X10(6)UL
SODIUM SERPL-SCNC: 143 MMOL/L (ref 136–145)
T4 FREE SERPL-MCNC: 1.5 NG/DL (ref 0.8–1.7)
TRIGL SERPL-MCNC: 54 MG/DL (ref 30–149)
TSI SER-ACNC: 2.32 UIU/ML (ref 0.55–4.78)
VIT B12 SERPL-MCNC: 602 PG/ML (ref 211–911)
VIT D+METAB SERPL-MCNC: 79.5 NG/ML (ref 30–100)
VLDLC SERPL CALC-MCNC: 10 MG/DL (ref 0–30)
WBC # BLD AUTO: 5.4 X10(3) UL (ref 4–11)

## 2025-02-18 PROCEDURE — 84443 ASSAY THYROID STIM HORMONE: CPT

## 2025-02-18 PROCEDURE — 80053 COMPREHEN METABOLIC PANEL: CPT

## 2025-02-18 PROCEDURE — 82607 VITAMIN B-12: CPT

## 2025-02-18 PROCEDURE — 80061 LIPID PANEL: CPT

## 2025-02-18 PROCEDURE — 84439 ASSAY OF FREE THYROXINE: CPT

## 2025-02-18 PROCEDURE — 85025 COMPLETE CBC W/AUTO DIFF WBC: CPT

## 2025-02-18 PROCEDURE — 36415 COLL VENOUS BLD VENIPUNCTURE: CPT

## 2025-02-18 PROCEDURE — 82306 VITAMIN D 25 HYDROXY: CPT

## 2025-03-17 ENCOUNTER — OFFICE VISIT (OUTPATIENT)
Dept: FAMILY MEDICINE CLINIC | Facility: CLINIC | Age: 70
End: 2025-03-17
Payer: MEDICARE

## 2025-03-17 VITALS
OXYGEN SATURATION: 98 % | BODY MASS INDEX: 28.14 KG/M2 | SYSTOLIC BLOOD PRESSURE: 104 MMHG | RESPIRATION RATE: 16 BRPM | DIASTOLIC BLOOD PRESSURE: 60 MMHG | HEIGHT: 69 IN | WEIGHT: 190 LBS | HEART RATE: 79 BPM

## 2025-03-17 DIAGNOSIS — G89.29 CHRONIC RIGHT-SIDED LOW BACK PAIN WITH RIGHT-SIDED SCIATICA: ICD-10-CM

## 2025-03-17 DIAGNOSIS — E03.9 HYPOTHYROIDISM, UNSPECIFIED TYPE: ICD-10-CM

## 2025-03-17 DIAGNOSIS — M17.0 PRIMARY OSTEOARTHRITIS OF BOTH KNEES: ICD-10-CM

## 2025-03-17 DIAGNOSIS — N95.1 VAGINAL DRYNESS, MENOPAUSAL: ICD-10-CM

## 2025-03-17 DIAGNOSIS — C43.9 MELANOMA OF SKIN (HCC): ICD-10-CM

## 2025-03-17 DIAGNOSIS — E04.1 THYROID NODULE: ICD-10-CM

## 2025-03-17 DIAGNOSIS — M17.12 PRIMARY OSTEOARTHRITIS OF LEFT KNEE: ICD-10-CM

## 2025-03-17 DIAGNOSIS — M54.41 CHRONIC RIGHT-SIDED LOW BACK PAIN WITH RIGHT-SIDED SCIATICA: ICD-10-CM

## 2025-03-17 DIAGNOSIS — Z12.31 VISIT FOR SCREENING MAMMOGRAM: ICD-10-CM

## 2025-03-17 DIAGNOSIS — Z00.00 ENCOUNTER FOR ANNUAL HEALTH EXAMINATION: Primary | ICD-10-CM

## 2025-03-17 RX ORDER — MINOXIDIL 2 %
SOLUTION, NON-ORAL TOPICAL 2 TIMES DAILY
COMMUNITY

## 2025-03-17 RX ORDER — LEVOTHYROXINE SODIUM 75 UG/1
75 TABLET ORAL
Qty: 90 TABLET | Refills: 3 | Status: SHIPPED | OUTPATIENT
Start: 2025-03-17

## 2025-03-17 RX ORDER — ESTRADIOL 0.1 MG/G
CREAM VAGINAL
Qty: 42.5 G | Refills: 3 | Status: SHIPPED | OUTPATIENT
Start: 2025-03-17

## 2025-03-17 NOTE — PATIENT INSTRUCTIONS
Eat at frequent intervals to increase your metabolic rate.  Have your first meal within 1 hour of waking up.   Eat every 2.5-3 hours after that.  Your last meal should be 3 hours before bed.   Try to reach for things that have a good amount of protein but don't stress about the quality of your food as we can fine tune this over time.    Try to drink 60-90 oz/water per day    The goal will be to get 7-9 hours of uninterrupted sleep  Many people do well taking magnesium glycinate 400-800 mg PLUS L-theanine 100-200 mg per night    When it comes to exercise, interval workouts will serve you best  You want to stick to low impact activity like walking, treadmill, stationary bike, elliptical, etc.  Take a 2-minute interval of time and for 30 seconds give it all you've got.  For the rest of that 2 minutes, go back to your normal pace.   Keep alternating this way for about 7 cycles (or 15 minutes) to start with.   You can add time as you feel comfortable.   Start with one or two days a week of these workouts and try to build up to 4-5 per week.

## 2025-03-17 NOTE — PROGRESS NOTES
Subjective:   Aliya Peraza is a 69 year old female who presents for a MA AHA (Medicare Advantage Annual Health Assessment) and Initial Annual Wellness Visit (outside the first 12 months of Medicare eligibility, no prior AWV) and scheduled follow up of multiple significant but stable problems.   History of Present Illness    Diet: balanced  Exercise: staying active  Tobacco use: denies  Drug use: denies  Alcohol use: socially  Marital status:   Occupation: [employer]     Health maintenance:  Pap/Hpv: 11/30/20 negative  Postmenopausal - using estrogen cream for vaginal dryness, needs refill  Mammogram: 10/24/22 stable  Performs SBEs: yes  Dexa scan: 3/27/23 osteopenia  Colonoscopy: 6/19/18, recall 5 years. H/o diverticulosis - had perforation of sigmoid colon, s/p colectomy and ostomy with subsequent take down  Discussed age appropriate vaccines     H/o thyroid nodules  Due for thyroid ultrasound    H/o sciatica and scoliosis  She is always aware of the pain  She does get zings of pain that are debilitating  States she was out walking, bent forward to tie shoe and became severe  Has happened with squatting/twisting  She can still run and be fine  Severe pain lasts 20 minutes until she walks it out    Also c/o increased weight  She is walking and eating healthy  Water intake could be better  Sleep: very good, uninterrupted       History/Other:   Fall Risk Assessment:   She has been screened for Falls and is low risk.      Cognitive Assessment:   She had a completely normal cognitive assessment - see flowsheet entries     Functional Ability/Status:   Aliya Peraza has a completely normal functional assessment. See flowsheet for details.        Depression Screening (PHQ):  PHQ-2 SCORE: 0  , done 3/17/2025             Advanced Directives:   She does have a Living Will but we do NOT have it on file in Epic.    She does have a POA but we do NOT have it on file in Epic.    Discussed Advance  Care Planning with patient (and family/surrogate if present). Standard forms made available to patient in After Visit Summary.      Patient Active Problem List   Diagnosis    Melanoma of skin (HCC)    Hypothyroidism    Primary osteoarthritis of both knees    Perforation of sigmoid colon due to diverticulitis    S/P laparoscopic colectomy    History of creation of ostomy (HCC)     Allergies:  She has No Known Allergies.    Current Medications:  Outpatient Medications Marked as Taking for the 3/17/25 encounter (Office Visit) with Elsie Medrano PA-C   Medication Sig    Minoxidil (ROGAINE WOMENS) 2 % External Solution Apply topically 2 (two) times daily.    levothyroxine 75 MCG Oral Tab Take 1 tablet (75 mcg total) by mouth before breakfast.    estradiol 0.1 MG/GM Vaginal Cream 1 gm PV 3 times a week    Multiple Vitamins-Minerals (MULTIVITAMIN OR) Take  by mouth.    Fiber Oral Chew Tab Chew by mouth.       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  Surgical History:  She  has a past surgical history that includes tonsillectomy and tubal ligation.   Family History:  Her family history includes Bladder Cancer in her father; Breast Cancer (age of onset: 50) in her maternal cousin female; Breast Cancer (age of onset: 60) in her sister; Colon Polyps in an other family member; Heart Disease in her father; Stroke in an other family member; brain tumor in her mother.  Social History:  She  reports that she has never smoked. She has never used smokeless tobacco. She reports current alcohol use of about 2.0 standard drinks of alcohol per week. She reports that she does not use drugs.    Tobacco:  She has never smoked tobacco.    CAGE Alcohol Screen:   CAGE screening score of 0 on  3/10/2025, showing low risk of alcohol abuse.      Patient Care Team:  Susan Mcmahan DO as PCP - General (Family Medicine)    Review of Systems   Constitutional:  Negative for chills, fatigue and fever.   HENT:  Negative for congestion, ear pain, rhinorrhea and sore throat.    Eyes:  Negative for visual disturbance.   Respiratory:  Negative for cough, shortness of breath and wheezing.    Cardiovascular:  Negative for chest pain, palpitations and leg swelling.   Gastrointestinal:  Negative for abdominal pain, diarrhea, nausea and vomiting.   Genitourinary:  Negative for dysuria, frequency and hematuria.   Musculoskeletal:  Positive for arthralgias and back pain. Negative for gait problem and myalgias.   Skin:  Negative for rash.   Neurological:  Negative for weakness, light-headedness and headaches.   Hematological:  Negative for adenopathy.   Psychiatric/Behavioral:  Negative for dysphoric mood. The patient is not nervous/anxious.           Objective:   Physical Exam  Vitals and nursing note reviewed.   Constitutional:       General: She is not in acute distress.     Appearance: Normal appearance. She is well-developed.   HENT:      Head: Normocephalic and atraumatic.      Right Ear: Tympanic membrane, ear canal and external ear normal.      Left Ear: Tympanic membrane, ear canal and external ear normal.      Nose: Nose normal.      Mouth/Throat:      Mouth: Mucous membranes are moist.   Eyes:      Extraocular Movements: Extraocular movements intact.      Conjunctiva/sclera: Conjunctivae normal.      Pupils: Pupils are equal, round, and reactive to light.   Neck:      Thyroid: No thyromegaly.   Cardiovascular:      Rate and Rhythm: Normal rate and regular rhythm.      Pulses: Normal pulses.      Heart sounds: Normal heart sounds.   Pulmonary:      Effort: Pulmonary effort is normal.      Breath sounds: Normal breath sounds. No wheezing or rales.   Abdominal:      General: Bowel sounds are normal. There is no  distension.      Palpations: Abdomen is soft. There is no mass.      Tenderness: There is no abdominal tenderness.   Musculoskeletal:         General: No tenderness. Normal range of motion.      Cervical back: Normal range of motion and neck supple.   Lymphadenopathy:      Cervical: No cervical adenopathy.   Skin:     General: Skin is warm and dry.      Findings: No rash.   Neurological:      General: No focal deficit present.      Mental Status: She is alert and oriented to person, place, and time.   Psychiatric:         Mood and Affect: Mood normal.         Behavior: Behavior normal.            /60   Pulse 79   Resp 16   Ht 5' 9\" (1.753 m)   Wt 190 lb (86.2 kg)   SpO2 98%   BMI 28.06 kg/m²  Estimated body mass index is 28.06 kg/m² as calculated from the following:    Height as of this encounter: 5' 9\" (1.753 m).    Weight as of this encounter: 190 lb (86.2 kg).    Medicare Hearing Assessment:   Hearing Screening    Screening Method: Whisper Test  Whisper Test Result: Pass         Visual Acuity:   Right Eye Visual Acuity: Corrected Right Eye Chart Acuity: 20/25   Left Eye Visual Acuity: Corrected Left Eye Chart Acuity: 20/25   Both Eyes Visual Acuity: Corrected Both Eyes Chart Acuity: 20/25   Able To Tolerate Visual Acuity: Yes        Assessment & Plan:   Aliya Peraza is a 69 year old female who presents for a Medicare Assessment.     Assessment & Plan  1. Encounter for annual health examination  Patient is generally healthy.  Physical exam is unremarkable.  Labs done last month and reviewed.  Health maintenance issues discussed. Encouraged routine vaccines.  Advised healthy diet and regular exercise.  Annual physicals.    2. Chronic right-sided low back pain with right-sided sciatica  Given duration and progression of symptoms, will obtain baseline xray l-spine. Follow up pending results. Refer for PT and/or to ortho.  - XR LUMBAR SPINE (MIN 4 VIEWS) (CPT=72590); Future    3. Thyroid  nodule  Repeat ultrasound and refer back to ENT if needed.   - US THYROID (CPT=76536); Future    4. Visit for screening mammogram  - [facility] MILIND 2D+3D SCREENING BILAT (CPT=77067/91195); Future    5. Hypothyroidism, unspecified type  Recent TSH levels stable. Continue LT4.   - levothyroxine 75 MCG Oral Tab; Take 1 tablet (75 mcg total) by mouth before breakfast.  Dispense: 90 tablet; Refill: 3    6. Vaginal dryness, menopausal  Cpm. Refills given.   - estradiol 0.1 MG/GM Vaginal Cream; 1 gm PV 3 times a week  Dispense: 42.5 g; Refill: 3    7. Melanoma of skin (HCC)  Ongoing follow up with dermatology.     8. Primary osteoarthritis of both knees  Stable. Monitor.       The patient indicates understanding of these issues and agrees to the plan.  Continue with current treatment plan.  Imaging studies ordered.  Lab work ordered.  Reinforced healthy diet, lifestyle, and exercise.      No follow-ups on file.     To Medrano PA-C, 3/17/2025     Supplementary Documentation:   General Health:  Type of Diet: (Patient-Rptd) Balanced  How does the patient maintain a good energy level?: (Patient-Rptd) Daily Walks  How would you describe your daily physical activity?: (Patient-Rptd) Light  How would you describe your current health state?: (Patient-Rptd) Good  How do you maintain positive mental well-being?: (Patient-Rptd) Social Interaction, Puzzles, Games, Visiting Family  On a scale of 0 to 10, with 0 being no pain and 10 being severe pain, what is your pain level?: (Patient-Rptd) 1 - (Mild)  In the past six months, have you experienced urine leakage?: (Patient-Rptd) 1-Yes  At any time do you feel concerned for the safety/well-being of yourself and/or your children, in your home or elsewhere?: (Patient-Rptd) No  Have you had any immunizations at another office such as Influenza, Hepatitis B, Tetanus, or Pneumococcal?: (Patient-Rptd) No    Health Maintenance   Topic Date Due    Pneumococcal Vaccine: 50+ Years (3 of 3 - PPSV23,  PCV20 or PCV21) 03/29/2018    Colorectal Cancer Screening  06/22/2023    Annual Well Visit  Never done    COVID-19 Vaccine (8 - 2024-25 season) 03/21/2025    Mammogram  04/08/2025    Influenza Vaccine  Completed    DEXA Scan  Completed    Annual Depression Screening  Completed    Fall Risk Screening (Annual)  Completed    Zoster Vaccines  Completed    Meningococcal B Vaccine  Aged Out

## 2025-04-10 ENCOUNTER — HOSPITAL ENCOUNTER (OUTPATIENT)
Dept: GENERAL RADIOLOGY | Age: 70
Discharge: HOME | End: 2025-04-10
Attending: PHYSICIAN ASSISTANT
Payer: MEDICARE

## 2025-04-10 ENCOUNTER — HOSPITAL ENCOUNTER (OUTPATIENT)
Dept: ULTRASOUND IMAGING | Age: 70
Discharge: HOME | End: 2025-04-10
Attending: PHYSICIAN ASSISTANT
Payer: MEDICARE

## 2025-04-10 DIAGNOSIS — M54.41 CHRONIC RIGHT-SIDED LOW BACK PAIN WITH RIGHT-SIDED SCIATICA: ICD-10-CM

## 2025-04-10 DIAGNOSIS — E04.1 THYROID NODULE: ICD-10-CM

## 2025-04-10 DIAGNOSIS — G89.29 CHRONIC RIGHT-SIDED LOW BACK PAIN WITH RIGHT-SIDED SCIATICA: ICD-10-CM

## 2025-04-10 PROCEDURE — 76536 US EXAM OF HEAD AND NECK: CPT | Performed by: PHYSICIAN ASSISTANT

## 2025-04-10 PROCEDURE — 72110 X-RAY EXAM L-2 SPINE 4/>VWS: CPT | Performed by: PHYSICIAN ASSISTANT

## 2025-04-18 ENCOUNTER — HOSPITAL ENCOUNTER (OUTPATIENT)
Dept: MAMMOGRAPHY | Age: 70
Discharge: HOME | End: 2025-04-18
Attending: PHYSICIAN ASSISTANT
Payer: MEDICARE

## 2025-04-18 DIAGNOSIS — Z12.31 VISIT FOR SCREENING MAMMOGRAM: ICD-10-CM

## 2025-04-18 PROCEDURE — 77067 SCR MAMMO BI INCL CAD: CPT | Performed by: PHYSICIAN ASSISTANT

## 2025-04-18 PROCEDURE — 77063 BREAST TOMOSYNTHESIS BI: CPT | Performed by: PHYSICIAN ASSISTANT

## 2025-04-24 ENCOUNTER — TELEPHONE (OUTPATIENT)
Dept: PHYSICAL THERAPY | Facility: HOSPITAL | Age: 70
End: 2025-04-24

## 2025-04-28 ENCOUNTER — OFFICE VISIT (OUTPATIENT)
Dept: PHYSICAL THERAPY | Age: 70
End: 2025-04-28
Attending: PHYSICIAN ASSISTANT
Payer: MEDICARE

## 2025-04-28 DIAGNOSIS — G89.29 CHRONIC RIGHT-SIDED LOW BACK PAIN WITH RIGHT-SIDED SCIATICA: Primary | ICD-10-CM

## 2025-04-28 DIAGNOSIS — M54.41 CHRONIC RIGHT-SIDED LOW BACK PAIN WITH RIGHT-SIDED SCIATICA: Primary | ICD-10-CM

## 2025-04-28 PROCEDURE — 97110 THERAPEUTIC EXERCISES: CPT

## 2025-04-28 PROCEDURE — 97161 PT EVAL LOW COMPLEX 20 MIN: CPT

## 2025-04-28 NOTE — PROGRESS NOTES
SPINE EVALUATION:     Diagnosis:   Chronic right-sided low back pain with right-sided sciatica (M54.41,G89.29); rehab Dx: lumbar instability Patient:  Aliya Peraza (69 year old, female)        Referring Provider: Elsie Medrano  Today's Date   4/28/2025    Precautions:  None   Date of Evaluation: 04/28/25  Next MD visit: No data recorded  Date of Surgery: No data recorded     PATIENT SUMMARY   Summary of chief complaints: low back pain, R LE pain  History of current condition: states that she has a 4 year hx of R sciatica on/off.  States that one month ago she bend down to tie her shoe lace and had a sharp pain in the R LB and LE> improved over the next couple days. states that 2 weeks weeks ago she was cleaning the kitchen and picking up a fork off the floor and had the sharp pain again> was stuck on the floor for about an hour due to the pain> went to see GP for physcial and mentioned the pain to the doctor> xrays: showed OA, DDD and grade 1 retrolisthesis> PT referral.   Pain level: current 0 /10, at best 0 /10, at worst 9 /10  Description of symptoms: sharp, electric pain   Occupation: Professor- semi retired   Leisure activities/Hobbies:     Prior level of function: ability to complete ADLs without back pain  Current limitations: sitting >30 mins, standing> >15 mins, bending over, lifting  Pt goals: get stronger and less episodes of pain  Red flag signs/symptoms: Pt denies changes in bowel/bladder function, saddle anesthesia    Past medical history was reviewed with Aliya.  Significant findings include:    Imaging/Tests: xrays: Lumbar vertebral bodies maintain normal height.  Prominent dextroconvex scoliosis centered at the L2-3 level.  Grade 1 retrolisthesis of L3 on L4.   Moderate disc height loss and endplate degenerative change noted at the L2-3 and L5-S1 levels.  Moderate lower lumbar facet arthrosis.  No appreciable pars defects.  No destructive osseous lesions.   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  She  has a past surgical history that includes tonsillectomy and tubal ligation.    ASSESSMENT  Aliya presents to physical therapy evaluation with primary c/o low back pain, R LE pain. The results of the objective tests and measures show impaired posture, gait, flexibility, ROM, joint mobility, muscle performance and motor function.  . Functional deficits include but are not limited to sitting >30 mins, standing> >15 mins, bending over, lifting. Signs and symptoms are consistent with diagnosis of Chronic right-sided low back pain with right-sided sciatica (M54.41,G89.29); rehab Dx: lumbar instability. Pt and PT discussed evaluation findings, pathology, POC and HEP.  Pt voiced understanding and performs HEP correctly without reported pain. Skilled Physical Therapy is medically necessary to address the above impairments and reach functional goals.    OBJECTIVE:    Musculoskeletal:  Observation/Posture: anterior pelvic tilt (increased lordosis)   Accessory Motion: pain with CPA L2-3-4-5   Palpation: significant tenderness on palpation of lumbar paraspinals and gluteals/piriformis on right     Special tests:   SLR: neg     ROM and Strength:  (* denotes performed with pain)  Trunk ROM MMT (-/5)     Flex 70 deg*       Ext 30 deg      R L R L     Side bend 15 deg* 15 deg         Rotation 75% 75%       ,   Hip   MMT (-/5)    R L     Flex (L2) 5 5     Ext          Abd 4 4     ER 4 4     IR 5 5    ,   Knee   MMT (-/5)    R L     Flex 5 5     Ext (L3) 5 5       Flexibility:  LE Flexibility R L     Hip Flexor         Hamstrings WNL WNL     ITB         Piriformis mod restricted mod restricted     Quads         Gastroc-soleus           Neurological:  Sensation:   intact to light touch        Balance and Functional Mobility:  Gait: pt ambulates on level ground with normal mechanics; other (use comment) (decreased trunk rotation).  Balance: SLS: EO R  , EO L            Today's Treatment and Response:   Pt education was provided on exam findings, treatment diagnosis, treatment plan, expectations, and prognosis.  Today's Treatment       4/28/2025   Spine Treatment   Therapeutic Exercise Minutes 10   Evaluation Minutes 35   Total Time Of Timed Procedures 10   Total Time Of Service-Based Procedures 35   Total Treatment Time 45   HEP Access Code: [email or web address]  URL: [email or web address]  Date: 04/28/2025  Prepared by: Susan Mcmahan    Exercises  - Supine Posterior Pelvic Tilt  - 3 x daily - 7 x weekly - 1 sets - 10 reps - 10 sec hold  - Supine Lower Trunk Rotation  - 3 x daily - 7 x weekly - 1 sets - 10 reps - 3 sec hold  - Supine Single Knee to Chest Stretch  - 3 x daily - 7 x weekly - 1 sets - 3 reps - 10 sec hold  - Hooklying Clamshell with Resistance  - 3 x daily - 7 x weekly - 2 sets - 10 reps - 3 sec hold  - Clamshell  - 3 x daily - 7 x weekly - 1 sets - 10 reps - 3 sec hold        Patient was instructed in and issued a HEP for: Access Code: [email or web address]  URL: [email or web address]  Date: 04/28/2025  Prepared by: Simbrant Mcmahan    Exercises  - Supine Posterior Pelvic Tilt  - 3 x daily - 7 x weekly - 1 sets - 10 reps - 10 sec hold  - Supine Lower Trunk Rotation  - 3 x daily - 7 x weekly - 1 sets - 10 reps - 3 sec hold  - Supine Single Knee to Chest Stretch  - 3 x daily - 7 x weekly - 1 sets - 3 reps - 10 sec hold  - Hooklying Clamshell with Resistance  - 3 x daily - 7 x weekly - 2 sets - 10 reps - 3 sec hold  - Clamshell  - 3 x daily - 7 x weekly - 1 sets - 10 reps - 3 sec hold    Charges:  PT EVAL: Low Complexity, Eval 1, Ex 1  In agreement with evaluation findings and clinical rationale, this evaluation involved LOW COMPLEXITY decision making due to no personal  factors/comorbidities, 1-2 body structures involved/activity limitations, and stable symptoms as documented in the evaluation.                                                                         PLAN OF CARE:    Goals: (to be met in 10 visits)    Not Met Progress Toward Partially Met Met   Pt will improve transversus abdominis recruitment to perform proper isometric contraction without requiring verbal or tactile cuing to promote advancement of therex. [] [] [] []   Pt will demonstrate good understanding of proper posture and body mechanics to decrease pain and improve spinal safety. [] [] [] []   Pt will improve lumbar spine AROM flexion to >80 deg to allow increase ease with bending forward to don shoes. [] [] [] []   Pt will report improved symptom centralization and absence of radicular symptoms for 3 consecutive days to improve function with ADLs. [] [] [] []   Pt will have decreased paraspinal mm tension to tolerate standing >30 minutes for work and home activities. [] [] [] []   Pt will demonstrate improved core strength to be able to perform bending  with <2/10 pain. [] [] [] []   Pt will be independent and compliant with comprehensive HEP to maintain progress achieved in PT [] [] [] []          Frequency / Duration: Patient will be seen 1-2x/week or a total of 10  visits over a 90 day period. Treatment will include: Manual Therapy; Mechanical Traction; Neuromuscular Re-education; Electrical stimulation (unattended); Home Exercise Program instruction; Therapeutic Exercise; Therapeutic Activities; Patient/Family Education    Education or treatment limitation: None   Rehab Potential: good     Oswestry Disability Index Score  Score: (Patient-Rptd) 36 % (4/28/2025 10:51 AM)      Patient/Family/Caregiver was advised of these findings, precautions, and treatment options and has agreed to actively participate in planning and for this course of care.    Thank you for your referral. Please co-sign or sign and  return this letter via fax as soon as possible to [phone]. If you have any questions, please contact me at Dept: [phone]    Sincerely,  Electronically signed by therapist: Susan Mcmahan PT  Physician's certification required: Yes  I certify the need for these services furnished under this plan of treatment and while under my care.    X___________________________________________________ Date____________________    Certification From: 4/28/2025  To:7/27/2025

## 2025-05-01 ENCOUNTER — OFFICE VISIT (OUTPATIENT)
Dept: PHYSICAL THERAPY | Age: 70
End: 2025-05-01
Attending: PHYSICIAN ASSISTANT
Payer: MEDICARE

## 2025-05-01 PROCEDURE — 97112 NEUROMUSCULAR REEDUCATION: CPT

## 2025-05-01 PROCEDURE — 97110 THERAPEUTIC EXERCISES: CPT

## 2025-05-01 NOTE — PROGRESS NOTES
Patient: Aliya Peraza (69 year old, female) Referring Provider:  Insurance:   Diagnosis: Chronic right-sided low back pain with right-sided sciatica (M54.41,G89.29); rehab Dx: lumbar instability Elsie Rushing  BLUE CROSS MCR   Date of Surgery: No data recorded Next MD visit:  N/A   Precautions:  None No data recorded Referral Information:    Date of Evaluation: Req: 0, Auth: 0, Exp:     No data recorded POC Auth Visits:  10       Today's Date   5/1/2025    Subjective  No new problems. Working on HEP 2x/day. Right hip feels a little sore today adn feels its from the clamshell exercise.       Pain: 1/10     Objective               Assessment  completed todays treatment without c/o increased pain or discomfort following treatment. required a lot of verbal cues for TrA contraction during stabilization training. bridges were difficult and painful but with YSB painfree and smaller range. advised pt to work on TrA contraction with walking. States understanding. Educated on delayed onset muscle soreness. Pt states understanding.    Goals (to be met in 10 visits)      Not Met Progress Toward Partially Met Met   Pt will improve transversus abdominis recruitment to perform proper isometric contraction without requiring verbal or tactile cuing to promote advancement of therex. [] [] [] []   Pt will demonstrate good understanding of proper posture and body mechanics to decrease pain and improve spinal safety. [] [] [] []   Pt will improve lumbar spine AROM flexion to >80 deg to allow increase ease with bending forward to don shoes. [] [] [] []   Pt will report improved symptom centralization and absence of radicular symptoms for 3 consecutive days to improve function with ADLs. [] [] [] []   Pt will have decreased paraspinal mm tension to tolerate standing >30 minutes for work and home activities. [] [] [] []   Pt will demonstrate improved core strength to be able to perform bending  with <2/10 pain. [] [] [] []   Pt  will be independent and compliant with comprehensive HEP to maintain progress achieved in PT [] [] [] []              Plan  continue POC. Progress as tolerated.    Treatment Last 4 Visits  Treatment Day: 2       4/28/2025 5/1/2025   Spine Treatment   Therapeutic Exercise  Nu step L3 x6 min  RTB lateral and monster band walking with band around knees 35ft x 2 ea  SKTC R/L 5x10 sec     Neuro Re-Education  Reformer 5 bands double knee ext with TrA 3x20 reps  Prone on bed: alt hip ext R/L x10 ea  Hkly: pelvic tilt 10x10 sec  Hkly: TrA with marches x10 ea  Hkly: bridges with LE on YSB 10x 5 sec  L/R side plank on knees 3x 5 sec  L/R s/l clamshells x10 ea   Therapeutic Exercise Minutes 10 10   Neuro Re-Educ Minutes  40   Evaluation Minutes 35    Total Time Of Timed Procedures 10 50   Total Time Of Service-Based Procedures 35 0   Total Treatment Time 45 50   HEP Access Code: [email or web address]  URL: [email or web address]  Date: 04/28/2025  Prepared by: Susan Mcmahan    Exercises  - Supine Posterior Pelvic Tilt  - 3 x daily - 7 x weekly - 1 sets - 10 reps - 10 sec hold  - Supine Lower Trunk Rotation  - 3 x daily - 7 x weekly - 1 sets - 10 reps - 3 sec hold  - Supine Single Knee to Chest Stretch  - 3 x daily - 7 x weekly - 1 sets - 3 reps - 10 sec hold  - Hooklying Clamshell with Resistance  - 3 x daily - 7 x weekly - 2 sets - 10 reps - 3 sec hold  - Clamshell  - 3 x daily - 7 x weekly - 1 sets - 10 reps - 3 sec hold Access Code: [email or web address]  URL: [email or web address]  Date: 04/28/2025  Prepared by: Simki Mcmahan     Exercises  - Supine Posterior Pelvic Tilt  - 3 x daily - 7 x weekly - 1 sets - 10 reps - 10 sec hold  - Supine Lower Trunk Rotation  - 3 x daily - 7 x weekly - 1 sets - 10 reps - 3 sec hold  - Supine Single Knee to Chest Stretch  - 3 x daily - 7 x weekly - 1 sets - 3 reps - 10 sec hold  - Hooklying Clamshell with Resistance  - 3 x daily - 7 x weekly - 2 sets - 10 reps - 3 sec hold  -  Clamshell  - 3 x daily - 7 x weekly - 1 sets - 10 reps - 3 sec hold          HEP  Access Code: [email or web address]  URL: [email or web address]  Date: 04/28/2025  Prepared by: Susan Mcmahan     Exercises  - Supine Posterior Pelvic Tilt  - 3 x daily - 7 x weekly - 1 sets - 10 reps - 10 sec hold  - Supine Lower Trunk Rotation  - 3 x daily - 7 x weekly - 1 sets - 10 reps - 3 sec hold  - Supine Single Knee to Chest Stretch  - 3 x daily - 7 x weekly - 1 sets - 3 reps - 10 sec hold  - Hooklying Clamshell with Resistance  - 3 x daily - 7 x weekly - 2 sets - 10 reps - 3 sec hold  - Clamshell  - 3 x daily - 7 x weekly - 1 sets - 10 reps - 3 sec hold      Charges  Ex 1, Nreed 2

## 2025-05-08 ENCOUNTER — OFFICE VISIT (OUTPATIENT)
Dept: PHYSICAL THERAPY | Age: 70
End: 2025-05-08
Attending: PHYSICIAN ASSISTANT
Payer: MEDICARE

## 2025-05-08 PROCEDURE — 97112 NEUROMUSCULAR REEDUCATION: CPT

## 2025-05-08 PROCEDURE — 97110 THERAPEUTIC EXERCISES: CPT

## 2025-05-08 NOTE — PROGRESS NOTES
Patient: Aliya Peraza (69 year old, female) Referring Provider:  Insurance:   Diagnosis: Chronic right-sided low back pain with right-sided sciatica (M54.41,G89.29); rehab Dx: lumbar instability Elsie Mitchell  BLUE CROSS MCR   Date of Surgery: No data recorded Next MD visit:  N/A   Precautions:  None No data recorded Referral Information:    Date of Evaluation: Req: 0, Auth: 0, Exp:     No data recorded POC Auth Visits:  10       Today's Date   5/8/2025    Subjective  No new problems. States that she hasnt had any electric zings since last visit. today she had to help a neighbor get up from the floor that fell. the back is a little sore but not painful. also been working in the kitchen a lot prepping for mother's day lunch/dinner.       Pain: 2/10     Objective             Assessment  completed todays treatment without c/o increased pain or discomfort following treatment. tolerated LP stabilization progressions very well. progressed HEP to include band walking RTB, dead bug and prone alternating UE/LE. did have R LB twinge of pain during KTC stretches which resolved with standign up and doing lumbar extension.    Goals (to be met in 10 visits)      Not Met Progress Toward Partially Met Met   Pt will improve transversus abdominis recruitment to perform proper isometric contraction without requiring verbal or tactile cuing to promote advancement of therex. [] [] [] []   Pt will demonstrate good understanding of proper posture and body mechanics to decrease pain and improve spinal safety. [] [] [] []   Pt will improve lumbar spine AROM flexion to >80 deg to allow increase ease with bending forward to don shoes. [] [] [] []   Pt will report improved symptom centralization and absence of radicular symptoms for 3 consecutive days to improve function with ADLs. [] [] [] []   Pt will have decreased paraspinal mm tension to tolerate standing >30 minutes for work and home activities. [] [] [] []   Pt will  demonstrate improved core strength to be able to perform bending  with <2/10 pain. [] [] [] []   Pt will be independent and compliant with comprehensive HEP to maintain progress achieved in PT [] [] [] []                  Plan  continue POC. Progress as tolerated.    Treatment Last 4 Visits  Treatment Day: 3       4/28/2025 5/1/2025 5/8/2025   Spine Treatment   Therapeutic Exercise  Nu step L3 x6 min  RTB lateral and monster band walking with band around knees 35ft x 2 ea  SKTC R/L 5x10 sec   Nu step L3 x6 min  RTB lateral and monster band walking with band around knees 35ft x 2 ea  SKTC R/L 5x10 sec     Neuro Re-Education  Reformer 5 bands double knee ext with TrA 3x20 reps  Prone on bed: alt hip ext R/L x10 ea  Hkly: pelvic tilt 10x10 sec  Hkly: TrA with marches x10 ea  Hkly: bridges with LE on YSB 10x 5 sec  L/R side plank on knees 3x 5 sec  L/R s/l clamshells x10 ea Reformer 5 bands double knee ext with TrA 3x20 reps  Prone on bed: alt hip ext R/L x10 ea  Hkly: pelvic tilt 10x10 sec  Hkly: TrA with marches x10 ea  Hkly: dead bug x10  Hkly: bridges with LE on YSB 10x 5 sec     Therapeutic Exercise Minutes 10 10 10   Neuro Re-Educ Minutes  40 35   Evaluation Minutes 35     Total Time Of Timed Procedures 10 50 45   Total Time Of Service-Based Procedures 35 0 0   Total Treatment Time 45 50 45   HEP Access Code: [email or web address]  URL: [email or web address]  Date: 04/28/2025  Prepared by: Susan Mcmahan    Exercises  - Supine Posterior Pelvic Tilt  - 3 x daily - 7 x weekly - 1 sets - 10 reps - 10 sec hold  - Supine Lower Trunk Rotation  - 3 x daily - 7 x weekly - 1 sets - 10 reps - 3 sec hold  - Supine Single Knee to Chest Stretch  - 3 x daily - 7 x weekly - 1 sets - 3 reps - 10 sec hold  - Hooklying Clamshell with Resistance  - 3 x daily - 7 x weekly - 2 sets - 10 reps - 3 sec hold  - Clamshell  - 3 x daily - 7 x weekly - 1 sets - 10 reps - 3 sec hold Access Code: [email or web address]  URL:  [email or web address]  Date: 04/28/2025  Prepared by: Susan Mcmahan     Exercises  - Supine Posterior Pelvic Tilt  - 3 x daily - 7 x weekly - 1 sets - 10 reps - 10 sec hold  - Supine Lower Trunk Rotation  - 3 x daily - 7 x weekly - 1 sets - 10 reps - 3 sec hold  - Supine Single Knee to Chest Stretch  - 3 x daily - 7 x weekly - 1 sets - 3 reps - 10 sec hold  - Hooklying Clamshell with Resistance  - 3 x daily - 7 x weekly - 2 sets - 10 reps - 3 sec hold  - Clamshell  - 3 x daily - 7 x weekly - 1 sets - 10 reps - 3 sec hold           HEP  Access Code: [email or web address]  URL: [email or web address]  Date: 04/28/2025  Prepared by: Susan Mcmahan     Exercises  - Supine Posterior Pelvic Tilt  - 3 x daily - 7 x weekly - 1 sets - 10 reps - 10 sec hold  - Supine Lower Trunk Rotation  - 3 x daily - 7 x weekly - 1 sets - 10 reps - 3 sec hold  - Supine Single Knee to Chest Stretch  - 3 x daily - 7 x weekly - 1 sets - 3 reps - 10 sec hold  - Hooklying Clamshell with Resistance  - 3 x daily - 7 x weekly - 2 sets - 10 reps - 3 sec hold  - Clamshell  - 3 x daily - 7 x weekly - 1 sets - 10 reps - 3 sec hold      Charges  Nreed 2, Ex 1

## 2025-05-20 ENCOUNTER — OFFICE VISIT (OUTPATIENT)
Dept: PHYSICAL THERAPY | Age: 70
End: 2025-05-20
Attending: PHYSICIAN ASSISTANT
Payer: MEDICARE

## 2025-05-20 PROCEDURE — 97110 THERAPEUTIC EXERCISES: CPT

## 2025-05-20 PROCEDURE — 97112 NEUROMUSCULAR REEDUCATION: CPT

## 2025-05-20 NOTE — PROGRESS NOTES
Patient: Aliya Peraza (69 year old, female) Referring Provider:  Insurance:   Diagnosis: Chronic right-sided low back pain with right-sided sciatica (M54.41,G89.29); rehab Dx: lumbar instability Elsie Moiseng  BLUE CROSS MCR   Date of Surgery: No data recorded Next MD visit:  N/A   Precautions:  None No data recorded Referral Information:    Date of Evaluation: Req: 0, Auth: 0, Exp:     No data recorded POC Auth Visits:  10       Today's Date   5/20/2025    Subjective  states that she walked a 5K on Sunday and did well with it but for the past week and half everytime she bends forward for washing dishes or brushing teeth her lower back hurts.       Pain: 3/10     Objective               Assessment  completed todays treatment without c/o increased pain or discomfort following treatment. tolerated LP stabilization progressions very well.  added seated lumbar flexion and seated figure 4 piriformis stretch to HEP. Required verbal and tactile cues for maintaining pelvic stability during exercises. taught balanced posture with bending forward to avois strain on back.    Goals (to be met in 10 visits)      Not Met Progress Toward Partially Met Met   Pt will improve transversus abdominis recruitment to perform proper isometric contraction without requiring verbal or tactile cuing to promote advancement of therex. [] [x] [] []   Pt will demonstrate good understanding of proper posture and body mechanics to decrease pain and improve spinal safety. [] [x] [] []   Pt will improve lumbar spine AROM flexion to >80 deg to allow increase ease with bending forward to don shoes. [] [x] [] []   Pt will report improved symptom centralization and absence of radicular symptoms for 3 consecutive days to improve function with ADLs. [] [x] [] []   Pt will have decreased paraspinal mm tension to tolerate standing >30 minutes for work and home activities. [] [x] [] []   Pt will demonstrate improved core strength to be able to  perform bending  with <2/10 pain. [] [x] [] []   Pt will be independent and compliant with comprehensive HEP to maintain progress achieved in PT [] [x] [] []                      Plan  continue POC. Progress as tolerated.    Treatment Last 4 Visits  Treatment Day: 4       4/28/2025 5/1/2025 5/8/2025 5/20/2025   Spine Treatment   Therapeutic Exercise  Nu step L3 x6 min  RTB lateral and monster band walking with band around knees 35ft x 2 ea  SKTC R/L 5x10 sec   Nu step L3 x6 min  RTB lateral and monster band walking with band around knees 35ft x 2 ea  SKTC R/L 5x10 sec   Nu step L3 x6 min  Standing lumbar extension x10  RTB lateral and monster band walking with band around knees 35ft x 2 ea  Prone press ups x10  Hkly: LTR x10 ea  SKTC R/L 5x10 sec  Seated piriformis stretch figure 4 3x 5-10 sec  Seated lumbar flexion 5 x10 sec   Neuro Re-Education  Reformer 5 bands double knee ext with TrA 3x20 reps  Prone on bed: alt hip ext R/L x10 ea  Hkly: pelvic tilt 10x10 sec  Hkly: TrA with marches x10 ea  Hkly: bridges with LE on YSB 10x 5 sec  L/R side plank on knees 3x 5 sec  L/R s/l clamshells x10 ea Reformer 5 bands double knee ext with TrA 3x20 reps  Prone on bed: alt hip ext R/L x10 ea  Hkly: pelvic tilt 10x10 sec  Hkly: TrA with marches x10 ea  Hkly: dead bug x10  Hkly: bridges with LE on YSB 10x 5 sec   Reformer 5 bands double knee ext with TrA 3x20 reps  Prone on bed: alt hip ext R/L x10 ea  Prone alt UE/LE lift x10 ea  Hkly: pelvic tilt 10x10 sec  Hkly: TrA bilat hip abd GTB 2x10 reps  Hkly: TrA with marches x10 ea  Hkly: dead bug x10  Hkly: bridges with LE on YSB 10x 5 sec     Additional Treatment    Education on balanced postures   Therapeutic Exercise Minutes 10 10 10 25   Neuro Re-Educ Minutes  40 35 30   Evaluation Minutes 35      Total Time Of Timed Procedures 10 50 45 55   Total Time Of Service-Based Procedures 35 0 0 0   Total Treatment Time 45 50 45 55   HEP Access Code: [email or web address]  URL:  [email or web address]  Date: 04/28/2025  Prepared by: Husseinki Mcmahan    Exercises  - Supine Posterior Pelvic Tilt  - 3 x daily - 7 x weekly - 1 sets - 10 reps - 10 sec hold  - Supine Lower Trunk Rotation  - 3 x daily - 7 x weekly - 1 sets - 10 reps - 3 sec hold  - Supine Single Knee to Chest Stretch  - 3 x daily - 7 x weekly - 1 sets - 3 reps - 10 sec hold  - Hooklying Clamshell with Resistance  - 3 x daily - 7 x weekly - 2 sets - 10 reps - 3 sec hold  - Clamshell  - 3 x daily - 7 x weekly - 1 sets - 10 reps - 3 sec hold Access Code: [email or web address]  URL: [email or web address]  Date: 04/28/2025  Prepared by: Susan Mcmahan     Exercises  - Supine Posterior Pelvic Tilt  - 3 x daily - 7 x weekly - 1 sets - 10 reps - 10 sec hold  - Supine Lower Trunk Rotation  - 3 x daily - 7 x weekly - 1 sets - 10 reps - 3 sec hold  - Supine Single Knee to Chest Stretch  - 3 x daily - 7 x weekly - 1 sets - 3 reps - 10 sec hold  - Hooklying Clamshell with Resistance  - 3 x daily - 7 x weekly - 2 sets - 10 reps - 3 sec hold  - Clamshell  - 3 x daily - 7 x weekly - 1 sets - 10 reps - 3 sec hold    Access Code: [email or web address]  URL: [email or web address]  Date: 05/20/2025  Prepared by: Simki Mcmahan    Exercises  - Supine Posterior Pelvic Tilt  - 3 x daily - 7 x weekly - 1 sets - 10 reps - 10 sec hold  - Supine Lower Trunk Rotation  - 3 x daily - 7 x weekly - 1 sets - 10 reps - 3 sec hold  - Supine Single Knee to Chest Stretch  - 3 x daily - 7 x weekly - 1 sets - 3 reps - 10 sec hold  - Hooklying Clamshell with Resistance  - 3 x daily - 7 x weekly - 2 sets - 10 reps - 3 sec hold  - Clamshell  - 3 x daily - 7 x weekly - 1 sets - 10 reps - 3 sec hold  - Side Stepping with Resistance at Ankles  - 1-2 x daily - 7 x weekly  - Band Walks  - 1-2 x daily - 7 x weekly  - Prone Alternating Arm and Leg Lifts  - 1-2 x daily - 7 x weekly - 1 sets - 10 reps - 2 sec hold  - Dead Bug  - 1-2 x daily - 7 x weekly - 1 sets  - 10 reps - 2-3 sec hold hold  - Seated Piriformis Stretch with Trunk Bend  - 1-2 x daily - 7 x weekly - 1 sets - 3 reps - 5-10 sec hold  - Seated Lumbar Flexion Stretch  - 1-2 x daily - 7 x weekly - 1 sets - 5 reps - 10 sec hold        HEP  Access Code: [email or web address]  URL: [email or web address]  Date: 05/20/2025  Prepared by: Susan Mcmahan    Exercises  - Supine Posterior Pelvic Tilt  - 3 x daily - 7 x weekly - 1 sets - 10 reps - 10 sec hold  - Supine Lower Trunk Rotation  - 3 x daily - 7 x weekly - 1 sets - 10 reps - 3 sec hold  - Supine Single Knee to Chest Stretch  - 3 x daily - 7 x weekly - 1 sets - 3 reps - 10 sec hold  - Hooklying Clamshell with Resistance  - 3 x daily - 7 x weekly - 2 sets - 10 reps - 3 sec hold  - Clamshell  - 3 x daily - 7 x weekly - 1 sets - 10 reps - 3 sec hold  - Side Stepping with Resistance at Ankles  - 1-2 x daily - 7 x weekly  - Band Walks  - 1-2 x daily - 7 x weekly  - Prone Alternating Arm and Leg Lifts  - 1-2 x daily - 7 x weekly - 1 sets - 10 reps - 2 sec hold  - Dead Bug  - 1-2 x daily - 7 x weekly - 1 sets - 10 reps - 2-3 sec hold hold  - Seated Piriformis Stretch with Trunk Bend  - 1-2 x daily - 7 x weekly - 1 sets - 3 reps - 5-10 sec hold  - Seated Lumbar Flexion Stretch  - 1-2 x daily - 7 x weekly - 1 sets - 5 reps - 10 sec hold      Charges  Ex 2 Nreed 2

## 2025-05-28 ENCOUNTER — OFFICE VISIT (OUTPATIENT)
Dept: PHYSICAL THERAPY | Age: 70
End: 2025-05-28
Attending: PHYSICIAN ASSISTANT
Payer: MEDICARE

## 2025-05-28 PROCEDURE — 97112 NEUROMUSCULAR REEDUCATION: CPT

## 2025-05-28 PROCEDURE — 97110 THERAPEUTIC EXERCISES: CPT

## 2025-05-28 NOTE — PROGRESS NOTES
Patient: Aliya Peraza (69 year old, female) Referring Provider:  Insurance:   Diagnosis: Chronic right-sided low back pain with right-sided sciatica (M54.41,G89.29); rehab Dx: lumbar instability No ref. provider found  BLUE CROSS MCR   Date of Surgery: No data recorded Next MD visit:  N/A   Precautions:  None No data recorded Referral Information:    Date of Evaluation: Req: 0, Auth: 0, Exp:     No data recorded POC Auth Visits:  10       Today's Date   5/28/2025    Subjective  No new problems. States that she hasn't felt any bad zings of pain since last visit.  the increased tenderness after her 5K is now resolved. today she feels a little tenderness in the right LB/buttock area       Pain: 2/10     Objective               Assessment  completed todays treatment without c/o increased pain or discomfort following treatment. LP stability is improving but still has a lot of core and lumbar weakness. Required less tactile cues today during stabilization exercises than prior sessions. educated on consistency with exercises and that it will take time to build up the strength. Pt states understanding.    Goals (to be met in 10 visits)      Not Met Progress Toward Partially Met Met   Pt will improve transversus abdominis recruitment to perform proper isometric contraction without requiring verbal or tactile cuing to promote advancement of therex. [] [x] [] []   Pt will demonstrate good understanding of proper posture and body mechanics to decrease pain and improve spinal safety. [] [x] [] []   Pt will improve lumbar spine AROM flexion to >80 deg to allow increase ease with bending forward to don shoes. [] [x] [] []   Pt will report improved symptom centralization and absence of radicular symptoms for 3 consecutive days to improve function with ADLs. [] [x] [] []   Pt will have decreased paraspinal mm tension to tolerate standing >30 minutes for work and home activities. [] [x] [] []   Pt will demonstrate improved  core strength to be able to perform bending  with <2/10 pain. [] [x] [] []   Pt will be independent and compliant with comprehensive HEP to maintain progress achieved in PT [] [x] [] []                          Plan  continue POC. Progress as tolerated.    Treatment Last 4 Visits  Treatment Day: 5 5/1/2025 5/8/2025 5/20/2025 5/28/2025   Spine Treatment   Therapeutic Exercise Nu step L3 x6 min  RTB lateral and monster band walking with band around knees 35ft x 2 ea  SKTC R/L 5x10 sec   Nu step L3 x6 min  RTB lateral and monster band walking with band around knees 35ft x 2 ea  SKTC R/L 5x10 sec   Nu step L3 x6 min  Standing lumbar extension x10  RTB lateral and monster band walking with band around knees 35ft x 2 ea  Prone press ups x10  Hkly: LTR x10 ea  SKTC R/L 5x10 sec  Seated piriformis stretch figure 4 3x 5-10 sec  Seated lumbar flexion 5 x10 sec Nu step L3 x6 min  Standing lumbar extension x10  RTB lateral and monster band walking with band around knees 35ft x 2 ea  Prone press ups x10  Hkly: LTR x10 ea  SKTC R/L 5x10 sec  Seated piriformis stretch figure 4 3x 5-10 sec  Seated lumbar flexion 5 x10 sec     Neuro Re-Education Reformer 5 bands double knee ext with TrA 3x20 reps  Prone on bed: alt hip ext R/L x10 ea  Hkly: pelvic tilt 10x10 sec  Hkly: TrA with marches x10 ea  Hkly: bridges with LE on YSB 10x 5 sec  L/R side plank on knees 3x 5 sec  L/R s/l clamshells x10 ea Reformer 5 bands double knee ext with TrA 3x20 reps  Prone on bed: alt hip ext R/L x10 ea  Hkly: pelvic tilt 10x10 sec  Hkly: TrA with marches x10 ea  Hkly: dead bug x10  Hkly: bridges with LE on YSB 10x 5 sec   Reformer 5 bands double knee ext with TrA 3x20 reps  Prone on bed: alt hip ext R/L x10 ea  Prone alt UE/LE lift x10 ea  Hkly: pelvic tilt 10x10 sec  Hkly: TrA bilat hip abd GTB 2x10 reps  Hkly: TrA with marches x10 ea  Hkly: dead bug x10  Hkly: bridges with LE on YSB 10x 5 sec   Reformer 5 bands double knee ext with TrA 3x20  reps  Prone on bed: alt hip ext R/L x10 ea  Prone alt UE/LE lift x10 ea  Hkly: pelvic tilt 10x10 sec  Hkly: TrA bilat hip abd GTB 2x10 reps  Hkly: TrA with marches x10 ea  Hkly: dead bug x10  Hkly: bridges with LE on YSB 10x 5 sec     Additional Treatment   Education on balanced postures    Therapeutic Exercise Minutes 10 10 25 20   Neuro Re-Educ Minutes 40 35 30 25   Total Time Of Timed Procedures 50 45 55 45   Total Time Of Service-Based Procedures 0 0 0 0   Total Treatment Time 50 45 55 45   HEP Access Code: [email or web address]  URL: [email or web address]  Date: 04/28/2025  Prepared by: Susan Mcmahan     Exercises  - Supine Posterior Pelvic Tilt  - 3 x daily - 7 x weekly - 1 sets - 10 reps - 10 sec hold  - Supine Lower Trunk Rotation  - 3 x daily - 7 x weekly - 1 sets - 10 reps - 3 sec hold  - Supine Single Knee to Chest Stretch  - 3 x daily - 7 x weekly - 1 sets - 3 reps - 10 sec hold  - Hooklying Clamshell with Resistance  - 3 x daily - 7 x weekly - 2 sets - 10 reps - 3 sec hold  - Clamshell  - 3 x daily - 7 x weekly - 1 sets - 10 reps - 3 sec hold    Access Code: [email or web address]  URL: [email or web address]  Date: 05/20/2025  Prepared by: Simki Mcmahan    Exercises  - Supine Posterior Pelvic Tilt  - 3 x daily - 7 x weekly - 1 sets - 10 reps - 10 sec hold  - Supine Lower Trunk Rotation  - 3 x daily - 7 x weekly - 1 sets - 10 reps - 3 sec hold  - Supine Single Knee to Chest Stretch  - 3 x daily - 7 x weekly - 1 sets - 3 reps - 10 sec hold  - Hooklying Clamshell with Resistance  - 3 x daily - 7 x weekly - 2 sets - 10 reps - 3 sec hold  - Clamshell  - 3 x daily - 7 x weekly - 1 sets - 10 reps - 3 sec hold  - Side Stepping with Resistance at Ankles  - 1-2 x daily - 7 x weekly  - Band Walks  - 1-2 x daily - 7 x weekly  - Prone Alternating Arm and Leg Lifts  - 1-2 x daily - 7 x weekly - 1 sets - 10 reps - 2 sec hold  - Dead Bug  - 1-2 x daily - 7 x weekly - 1 sets - 10 reps - 2-3 sec hold hold  -  Seated Piriformis Stretch with Trunk Bend  - 1-2 x daily - 7 x weekly - 1 sets - 3 reps - 5-10 sec hold  - Seated Lumbar Flexion Stretch  - 1-2 x daily - 7 x weekly - 1 sets - 5 reps - 10 sec hold Access Code: [email or web address]  URL: [email or web address]  Date: 05/28/2025  Prepared by: Susan Mcmahan    Exercises  - Supine Posterior Pelvic Tilt  - 3 x daily - 7 x weekly - 1 sets - 10 reps - 10 sec hold  - Supine Lower Trunk Rotation  - 3 x daily - 7 x weekly - 1 sets - 10 reps - 3 sec hold  - Supine Single Knee to Chest Stretch  - 3 x daily - 7 x weekly - 1 sets - 3 reps - 10 sec hold  - Hooklying Clamshell with Resistance  - 3 x daily - 7 x weekly - 2 sets - 10 reps - 3 sec hold  - Clamshell  - 3 x daily - 7 x weekly - 1 sets - 10 reps - 3 sec hold  - Side Stepping with Resistance at Ankles  - 1-2 x daily - 7 x weekly  - Band Walks  - 1-2 x daily - 7 x weekly  - Prone Alternating Arm and Leg Lifts  - 1-2 x daily - 7 x weekly - 1 sets - 10 reps - 2 sec hold  - Dead Bug  - 1-2 x daily - 7 x weekly - 1 sets - 10 reps - 2-3 sec hold hold  - Seated Piriformis Stretch with Trunk Bend  - 1-2 x daily - 7 x weekly - 1 sets - 3 reps - 5-10 sec hold  - Seated Lumbar Flexion Stretch  - 1-2 x daily - 7 x weekly - 1 sets - 5 reps - 10 sec hold  - Standing Lumbar Extension  - 2-3 x daily - 7 x weekly - 1 sets - 10 reps - 2 sec hold  - Prone Press Up  - 1-2 x daily - 7 x weekly - 1 sets - 10 reps - 2 sec hold        HEP  Access Code: [email or web address]  URL: [email or web address]  Date: 05/28/2025  Prepared by: Susan Mcmahan    Exercises  - Supine Posterior Pelvic Tilt  - 3 x daily - 7 x weekly - 1 sets - 10 reps - 10 sec hold  - Supine Lower Trunk Rotation  - 3 x daily - 7 x weekly - 1 sets - 10 reps - 3 sec hold  - Supine Single Knee to Chest Stretch  - 3 x daily - 7 x weekly - 1 sets - 3 reps - 10 sec hold  - Hooklying Clamshell with Resistance  - 3 x daily - 7 x weekly - 2 sets - 10 reps - 3 sec hold  -  Clamshell  - 3 x daily - 7 x weekly - 1 sets - 10 reps - 3 sec hold  - Side Stepping with Resistance at Ankles  - 1-2 x daily - 7 x weekly  - Band Walks  - 1-2 x daily - 7 x weekly  - Prone Alternating Arm and Leg Lifts  - 1-2 x daily - 7 x weekly - 1 sets - 10 reps - 2 sec hold  - Dead Bug  - 1-2 x daily - 7 x weekly - 1 sets - 10 reps - 2-3 sec hold hold  - Seated Piriformis Stretch with Trunk Bend  - 1-2 x daily - 7 x weekly - 1 sets - 3 reps - 5-10 sec hold  - Seated Lumbar Flexion Stretch  - 1-2 x daily - 7 x weekly - 1 sets - 5 reps - 10 sec hold  - Standing Lumbar Extension  - 2-3 x daily - 7 x weekly - 1 sets - 10 reps - 2 sec hold  - Prone Press Up  - 1-2 x daily - 7 x weekly - 1 sets - 10 reps - 2 sec hold    Charges  Ex 1, NReed 2

## 2025-06-03 ENCOUNTER — APPOINTMENT (OUTPATIENT)
Dept: PHYSICAL THERAPY | Age: 70
End: 2025-06-03
Payer: MEDICARE

## 2025-06-05 ENCOUNTER — OFFICE VISIT (OUTPATIENT)
Dept: PHYSICAL THERAPY | Age: 70
End: 2025-06-05
Attending: PHYSICIAN ASSISTANT
Payer: MEDICARE

## 2025-06-05 PROCEDURE — 97112 NEUROMUSCULAR REEDUCATION: CPT

## 2025-06-05 PROCEDURE — 97110 THERAPEUTIC EXERCISES: CPT

## 2025-06-05 NOTE — PROGRESS NOTES
Patient: Aliya Peraza (69 year old, female) Referring Provider:  Insurance:   Diagnosis: Chronic right-sided low back pain with right-sided sciatica (M54.41,G89.29); rehab Dx: lumbar instability No ref. provider found  BLUE CROSS MCR   Date of Surgery: No data recorded Next MD visit:  N/A   Precautions:  None No data recorded Referral Information:    Date of Evaluation: Req: 0, Auth: 0, Exp:     No data recorded POC Auth Visits:  10       Today's Date   6/5/2025    Subjective  was bending over to unloading the  and her proximal thighs felt weird and numb whcih went away withina  few minutes. HEP is going well.       Pain: 0/10     Objective               Assessment  completed todays treatment without c/o increased pain or discomfort following treatment.  significant improvement noted with LP stability with exercises today. tolerated core and strengthening progressions today very well without increased pain but c/o increased fatigue of muscle. making good progress towards goals.    Goals (to be met in 10 visits)      Not Met Progress Toward Partially Met Met   Pt will improve transversus abdominis recruitment to perform proper isometric contraction without requiring verbal or tactile cuing to promote advancement of therex. [] [x] [] []   Pt will demonstrate good understanding of proper posture and body mechanics to decrease pain and improve spinal safety. [] [x] [] []   Pt will improve lumbar spine AROM flexion to >80 deg to allow increase ease with bending forward to don shoes. [] [x] [] []   Pt will report improved symptom centralization and absence of radicular symptoms for 3 consecutive days to improve function with ADLs. [] [x] [] []   Pt will have decreased paraspinal mm tension to tolerate standing >30 minutes for work and home activities. [] [x] [] []   Pt will demonstrate improved core strength to be able to perform bending  with <2/10 pain. [] [x] [] []   Pt will be independent and  compliant with comprehensive HEP to maintain progress achieved in PT [] [x] [] []                              Plan  continue POC. Progress as tolerated.    Treatment Last 4 Visits  Treatment Day: 6 5/8/2025 5/20/2025 5/28/2025 6/5/2025   Spine Treatment   Therapeutic Exercise Nu step L3 x6 min  RTB lateral and monster band walking with band around knees 35ft x 2 ea  SKTC R/L 5x10 sec   Nu step L3 x6 min  Standing lumbar extension x10  RTB lateral and monster band walking with band around knees 35ft x 2 ea  Prone press ups x10  Hkly: LTR x10 ea  SKTC R/L 5x10 sec  Seated piriformis stretch figure 4 3x 5-10 sec  Seated lumbar flexion 5 x10 sec Nu step L3 x6 min  Standing lumbar extension x10  RTB lateral and monster band walking with band around knees 35ft x 2 ea  Prone press ups x10  Hkly: LTR x10 ea  SKTC R/L 5x10 sec  Seated piriformis stretch figure 4 3x 5-10 sec  Seated lumbar flexion 5 x10 sec   Nu step L3 x6 min  Standing lumbar extension x10  GTB lateral and monster band walking with band around knees 35ft x 2 ea  Prone press ups x10  Hkly: LTR x10 ea  SKTC R/L 5x10 sec  Seated piriformis stretch figure 4 3x 5-10 sec  Seated lumbar flexion 5 x10 sec     Neuro Re-Education Reformer 5 bands double knee ext with TrA 3x20 reps  Prone on bed: alt hip ext R/L x10 ea  Hkly: pelvic tilt 10x10 sec  Hkly: TrA with marches x10 ea  Hkly: dead bug x10  Hkly: bridges with LE on YSB 10x 5 sec   Reformer 5 bands double knee ext with TrA 3x20 reps  Prone on bed: alt hip ext R/L x10 ea  Prone alt UE/LE lift x10 ea  Hkly: pelvic tilt 10x10 sec  Hkly: TrA bilat hip abd GTB 2x10 reps  Hkly: TrA with marches x10 ea  Hkly: dead bug x10  Hkly: bridges with LE on YSB 10x 5 sec   Reformer 5 bands double knee ext with TrA 3x20 reps  Prone on bed: alt hip ext R/L x10 ea  Prone alt UE/LE lift x10 ea  Hkly: pelvic tilt 10x10 sec  Hkly: TrA bilat hip abd GTB 2x10 reps  Hkly: TrA with marches x10 ea  Hkly: dead bug x10  Hkly: bridges  with LE on YSB 10x 5 sec   Reformer 5 bands double knee ext with TrA 3x20 reps  Prone on bed: alt hip ext R/L x10 ea  Prone alt UE/LE lift x10 ea  Hkly: pelvic tilt 10x10 sec  Hkly: TrA bilat hip abd GTB 3x10 reps  Hkly: TrA with marches with GTB x10 ea  Hkly: dead bug with GTB x10  Hkly: bridges with LE on YSB 10x 5 sec     Additional Treatment  Education on balanced postures     Therapeutic Exercise Minutes 10 25 20 20   Neuro Re-Educ Minutes 35 30 25 25   Total Time Of Timed Procedures 45 55 45 45   Total Time Of Service-Based Procedures 0 0 0 0   Total Treatment Time 45 55 45 45   HEP  Access Code: [email or web address]  URL: [email or web address]  Date: 05/20/2025  Prepared by: Susan Mcmahan    Exercises  - Supine Posterior Pelvic Tilt  - 3 x daily - 7 x weekly - 1 sets - 10 reps - 10 sec hold  - Supine Lower Trunk Rotation  - 3 x daily - 7 x weekly - 1 sets - 10 reps - 3 sec hold  - Supine Single Knee to Chest Stretch  - 3 x daily - 7 x weekly - 1 sets - 3 reps - 10 sec hold  - Hooklying Clamshell with Resistance  - 3 x daily - 7 x weekly - 2 sets - 10 reps - 3 sec hold  - Clamshell  - 3 x daily - 7 x weekly - 1 sets - 10 reps - 3 sec hold  - Side Stepping with Resistance at Ankles  - 1-2 x daily - 7 x weekly  - Band Walks  - 1-2 x daily - 7 x weekly  - Prone Alternating Arm and Leg Lifts  - 1-2 x daily - 7 x weekly - 1 sets - 10 reps - 2 sec hold  - Dead Bug  - 1-2 x daily - 7 x weekly - 1 sets - 10 reps - 2-3 sec hold hold  - Seated Piriformis Stretch with Trunk Bend  - 1-2 x daily - 7 x weekly - 1 sets - 3 reps - 5-10 sec hold  - Seated Lumbar Flexion Stretch  - 1-2 x daily - 7 x weekly - 1 sets - 5 reps - 10 sec hold Access Code: [email or web address]  URL: [email or web address]  Date: 05/28/2025  Prepared by: Susan Mcmahan    Exercises  - Supine Posterior Pelvic Tilt  - 3 x daily - 7 x weekly - 1 sets - 10 reps - 10 sec hold  - Supine Lower Trunk Rotation  - 3 x daily - 7 x weekly - 1 sets - 10  reps - 3 sec hold  - Supine Single Knee to Chest Stretch  - 3 x daily - 7 x weekly - 1 sets - 3 reps - 10 sec hold  - Hooklying Clamshell with Resistance  - 3 x daily - 7 x weekly - 2 sets - 10 reps - 3 sec hold  - Clamshell  - 3 x daily - 7 x weekly - 1 sets - 10 reps - 3 sec hold  - Side Stepping with Resistance at Ankles  - 1-2 x daily - 7 x weekly  - Band Walks  - 1-2 x daily - 7 x weekly  - Prone Alternating Arm and Leg Lifts  - 1-2 x daily - 7 x weekly - 1 sets - 10 reps - 2 sec hold  - Dead Bug  - 1-2 x daily - 7 x weekly - 1 sets - 10 reps - 2-3 sec hold hold  - Seated Piriformis Stretch with Trunk Bend  - 1-2 x daily - 7 x weekly - 1 sets - 3 reps - 5-10 sec hold  - Seated Lumbar Flexion Stretch  - 1-2 x daily - 7 x weekly - 1 sets - 5 reps - 10 sec hold  - Standing Lumbar Extension  - 2-3 x daily - 7 x weekly - 1 sets - 10 reps - 2 sec hold  - Prone Press Up  - 1-2 x daily - 7 x weekly - 1 sets - 10 reps - 2 sec hold         HEP  Access Code: [email or web address]  URL: [email or web address]  Date: 05/28/2025  Prepared by: Susan Mcmahan    Exercises  - Supine Posterior Pelvic Tilt  - 3 x daily - 7 x weekly - 1 sets - 10 reps - 10 sec hold  - Supine Lower Trunk Rotation  - 3 x daily - 7 x weekly - 1 sets - 10 reps - 3 sec hold  - Supine Single Knee to Chest Stretch  - 3 x daily - 7 x weekly - 1 sets - 3 reps - 10 sec hold  - Hooklying Clamshell with Resistance  - 3 x daily - 7 x weekly - 2 sets - 10 reps - 3 sec hold  - Clamshell  - 3 x daily - 7 x weekly - 1 sets - 10 reps - 3 sec hold  - Side Stepping with Resistance at Ankles  - 1-2 x daily - 7 x weekly  - Band Walks  - 1-2 x daily - 7 x weekly  - Prone Alternating Arm and Leg Lifts  - 1-2 x daily - 7 x weekly - 1 sets - 10 reps - 2 sec hold  - Dead Bug  - 1-2 x daily - 7 x weekly - 1 sets - 10 reps - 2-3 sec hold hold  - Seated Piriformis Stretch with Trunk Bend  - 1-2 x daily - 7 x weekly - 1 sets - 3 reps - 5-10 sec hold  - Seated Lumbar Flexion  Stretch  - 1-2 x daily - 7 x weekly - 1 sets - 5 reps - 10 sec hold  - Standing Lumbar Extension  - 2-3 x daily - 7 x weekly - 1 sets - 10 reps - 2 sec hold  - Prone Press Up  - 1-2 x daily - 7 x weekly - 1 sets - 10 reps - 2 sec hold    Charges  Ex 1, NReed 2

## 2025-06-09 ENCOUNTER — OFFICE VISIT (OUTPATIENT)
Dept: PHYSICAL THERAPY | Age: 70
End: 2025-06-09
Attending: PHYSICIAN ASSISTANT
Payer: MEDICARE

## 2025-06-09 PROCEDURE — 97110 THERAPEUTIC EXERCISES: CPT

## 2025-06-09 PROCEDURE — 97112 NEUROMUSCULAR REEDUCATION: CPT

## 2025-06-09 NOTE — PROGRESS NOTES
Patient: Aliya Peraza (69 year old, female) Referring Provider:  Insurance:   Diagnosis: Chronic right-sided low back pain with right-sided sciatica (M54.41,G89.29); rehab Dx: lumbar instability No ref. provider found  BLUE CROSS MCR   Date of Surgery: No data recorded Next MD visit:  N/A   Precautions:  None No data recorded Referral Information:    Date of Evaluation: Req: 0, Auth: 0, Exp:     No data recorded POC Auth Visits:  10       Today's Date   6/9/2025    Subjective  No new problems. states no zings of pain since last session. Is feeling better overall with her back pain and feeling stronger in her core.       Pain: 1/10     Objective             Assessment  completed todays treatment without c/o increased pain or discomfort following treatment.  continues to have good LP stability with core excercises today. progressing well with pain and function with having less pain and improved mobility. bridges without SB tolerated well today.    Goals (to be met in 10 visits)      Not Met Progress Toward Partially Met Met   Pt will improve transversus abdominis recruitment to perform proper isometric contraction without requiring verbal or tactile cuing to promote advancement of therex. [] [x] [] []   Pt will demonstrate good understanding of proper posture and body mechanics to decrease pain and improve spinal safety. [] [x] [] []   Pt will improve lumbar spine AROM flexion to >80 deg to allow increase ease with bending forward to don shoes. [] [x] [] []   Pt will report improved symptom centralization and absence of radicular symptoms for 3 consecutive days to improve function with ADLs. [] [x] [] []   Pt will have decreased paraspinal mm tension to tolerate standing >30 minutes for work and home activities. [] [x] [] []   Pt will demonstrate improved core strength to be able to perform bending  with <2/10 pain. [] [x] [] []   Pt will be independent and compliant with comprehensive HEP to maintain  progress achieved in PT [] [x] [] []                                  Plan  continue POC. Progress as tolerated.    Treatment Last 4 Visits  Treatment Day: 7 5/20/2025 5/28/2025 6/5/2025 6/9/2025   Spine Treatment   Therapeutic Exercise Nu step L3 x6 min  Standing lumbar extension x10  RTB lateral and monster band walking with band around knees 35ft x 2 ea  Prone press ups x10  Hkly: LTR x10 ea  SKTC R/L 5x10 sec  Seated piriformis stretch figure 4 3x 5-10 sec  Seated lumbar flexion 5 x10 sec Nu step L3 x6 min  Standing lumbar extension x10  RTB lateral and monster band walking with band around knees 35ft x 2 ea  Prone press ups x10  Hkly: LTR x10 ea  SKTC R/L 5x10 sec  Seated piriformis stretch figure 4 3x 5-10 sec  Seated lumbar flexion 5 x10 sec   Nu step L3 x6 min  Standing lumbar extension x10  GTB lateral and monster band walking with band around knees 35ft x 2 ea  Prone press ups x10  Hkly: LTR x10 ea  SKTC R/L 5x10 sec  Seated piriformis stretch figure 4 3x 5-10 sec  Seated lumbar flexion 5 x10 sec   Nu step L3 x6 min  Standing lumbar extension x10  GTB lateral and monster band walking with band around knees 35ft x 2 ea  Prone press ups x10  Hkly: LTR x10 ea  SKTC R/L 5x10 sec  Seated piriformis stretch figure 4 3x 5-10 sec  Seated lumbar flexion 5 x10 sec     Neuro Re-Education Reformer 5 bands double knee ext with TrA 3x20 reps  Prone on bed: alt hip ext R/L x10 ea  Prone alt UE/LE lift x10 ea  Hkly: pelvic tilt 10x10 sec  Hkly: TrA bilat hip abd GTB 2x10 reps  Hkly: TrA with marches x10 ea  Hkly: dead bug x10  Hkly: bridges with LE on YSB 10x 5 sec   Reformer 5 bands double knee ext with TrA 3x20 reps  Prone on bed: alt hip ext R/L x10 ea  Prone alt UE/LE lift x10 ea  Hkly: pelvic tilt 10x10 sec  Hkly: TrA bilat hip abd GTB 2x10 reps  Hkly: TrA with marches x10 ea  Hkly: dead bug x10  Hkly: bridges with LE on YSB 10x 5 sec   Reformer 5 bands double knee ext with TrA 3x20 reps  Prone on bed: alt hip  ext R/L x10 ea  Prone alt UE/LE lift x10 ea  Hkly: pelvic tilt 10x10 sec  Hkly: TrA bilat hip abd GTB 3x10 reps  Hkly: TrA with marches with GTB x10 ea  Hkly: dead bug with GTB x10  Hkly: bridges with LE on YSB 10x 5 sec   Reformer 5 bands double knee ext with TrA 3x20 reps  Prone on bed: alt hip ext R/L x10 ea  Prone alt UE/LE lift x10 ea  Hkly: pelvic tilt 10x10 sec  Hkly: TrA bilat hip abd GTB 3x10 reps  Hkly: TrA with marches with GTB x10 ea  Hkly: dead bug with GTB x10  Hkly: bridges with GTB 10x 5 sec     Additional Treatment Education on balanced postures      Therapeutic Exercise Minutes 25 20 20 20   Neuro Re-Educ Minutes 30 25 25 25   Total Time Of Timed Procedures 55 45 45 45   Total Time Of Service-Based Procedures 0 0 0 0   Total Treatment Time 55 45 45 45   HEP Access Code: [email or web address]  URL: [email or web address]  Date: 05/20/2025  Prepared by: Susan Mcmahan    Exercises  - Supine Posterior Pelvic Tilt  - 3 x daily - 7 x weekly - 1 sets - 10 reps - 10 sec hold  - Supine Lower Trunk Rotation  - 3 x daily - 7 x weekly - 1 sets - 10 reps - 3 sec hold  - Supine Single Knee to Chest Stretch  - 3 x daily - 7 x weekly - 1 sets - 3 reps - 10 sec hold  - Hooklying Clamshell with Resistance  - 3 x daily - 7 x weekly - 2 sets - 10 reps - 3 sec hold  - Clamshell  - 3 x daily - 7 x weekly - 1 sets - 10 reps - 3 sec hold  - Side Stepping with Resistance at Ankles  - 1-2 x daily - 7 x weekly  - Band Walks  - 1-2 x daily - 7 x weekly  - Prone Alternating Arm and Leg Lifts  - 1-2 x daily - 7 x weekly - 1 sets - 10 reps - 2 sec hold  - Dead Bug  - 1-2 x daily - 7 x weekly - 1 sets - 10 reps - 2-3 sec hold hold  - Seated Piriformis Stretch with Trunk Bend  - 1-2 x daily - 7 x weekly - 1 sets - 3 reps - 5-10 sec hold  - Seated Lumbar Flexion Stretch  - 1-2 x daily - 7 x weekly - 1 sets - 5 reps - 10 sec hold Access Code: [email or web address]  URL: [email or web address]  Date: 05/28/2025  Prepared by:  Susan Mcmahan    Exercises  - Supine Posterior Pelvic Tilt  - 3 x daily - 7 x weekly - 1 sets - 10 reps - 10 sec hold  - Supine Lower Trunk Rotation  - 3 x daily - 7 x weekly - 1 sets - 10 reps - 3 sec hold  - Supine Single Knee to Chest Stretch  - 3 x daily - 7 x weekly - 1 sets - 3 reps - 10 sec hold  - Hooklying Clamshell with Resistance  - 3 x daily - 7 x weekly - 2 sets - 10 reps - 3 sec hold  - Clamshell  - 3 x daily - 7 x weekly - 1 sets - 10 reps - 3 sec hold  - Side Stepping with Resistance at Ankles  - 1-2 x daily - 7 x weekly  - Band Walks  - 1-2 x daily - 7 x weekly  - Prone Alternating Arm and Leg Lifts  - 1-2 x daily - 7 x weekly - 1 sets - 10 reps - 2 sec hold  - Dead Bug  - 1-2 x daily - 7 x weekly - 1 sets - 10 reps - 2-3 sec hold hold  - Seated Piriformis Stretch with Trunk Bend  - 1-2 x daily - 7 x weekly - 1 sets - 3 reps - 5-10 sec hold  - Seated Lumbar Flexion Stretch  - 1-2 x daily - 7 x weekly - 1 sets - 5 reps - 10 sec hold  - Standing Lumbar Extension  - 2-3 x daily - 7 x weekly - 1 sets - 10 reps - 2 sec hold  - Prone Press Up  - 1-2 x daily - 7 x weekly - 1 sets - 10 reps - 2 sec hold          HEP  Access Code: [email or web address]  URL: [email or web address]  Date: 05/28/2025  Prepared by: Susan Mcmahan    Exercises  - Supine Posterior Pelvic Tilt  - 3 x daily - 7 x weekly - 1 sets - 10 reps - 10 sec hold  - Supine Lower Trunk Rotation  - 3 x daily - 7 x weekly - 1 sets - 10 reps - 3 sec hold  - Supine Single Knee to Chest Stretch  - 3 x daily - 7 x weekly - 1 sets - 3 reps - 10 sec hold  - Hooklying Clamshell with Resistance  - 3 x daily - 7 x weekly - 2 sets - 10 reps - 3 sec hold  - Clamshell  - 3 x daily - 7 x weekly - 1 sets - 10 reps - 3 sec hold  - Side Stepping with Resistance at Ankles  - 1-2 x daily - 7 x weekly  - Band Walks  - 1-2 x daily - 7 x weekly  - Prone Alternating Arm and Leg Lifts  - 1-2 x daily - 7 x weekly - 1 sets - 10 reps - 2 sec hold  - Dead Bug  - 1-2 x  daily - 7 x weekly - 1 sets - 10 reps - 2-3 sec hold hold  - Seated Piriformis Stretch with Trunk Bend  - 1-2 x daily - 7 x weekly - 1 sets - 3 reps - 5-10 sec hold  - Seated Lumbar Flexion Stretch  - 1-2 x daily - 7 x weekly - 1 sets - 5 reps - 10 sec hold  - Standing Lumbar Extension  - 2-3 x daily - 7 x weekly - 1 sets - 10 reps - 2 sec hold  - Prone Press Up  - 1-2 x daily - 7 x weekly - 1 sets - 10 reps - 2 sec hold    Charges  Ex 1, Nreed 2

## 2025-06-23 ENCOUNTER — OFFICE VISIT (OUTPATIENT)
Dept: PHYSICAL THERAPY | Age: 70
End: 2025-06-23
Attending: FAMILY MEDICINE
Payer: MEDICARE

## 2025-06-23 PROCEDURE — 97110 THERAPEUTIC EXERCISES: CPT

## 2025-06-23 PROCEDURE — 97112 NEUROMUSCULAR REEDUCATION: CPT

## 2025-06-23 NOTE — PROGRESS NOTES
Patient: Aliya Peraza (69 year old, female) Referring Provider:  Insurance:   Diagnosis: Chronic right-sided low back pain with right-sided sciatica (M54.41,G89.29); rehab Dx: lumbar instability No ref. provider found  BLUE CROSS MCR   Date of Surgery: No data recorded Next MD visit:  N/A   Precautions:  None No data recorded Referral Information:    Date of Evaluation: Req: 0, Auth: 0, Exp:     No data recorded POC Auth Visits:  10       Today's Date   6/23/2025    Subjective  No new problems. States that she was outdoors all weekend long for her grandson's baseball tournament. there was a lot of standing, sitting, walking. her back did okay but she had a lot of fatigue. has not had any zings of pain. was not able to work on HEP much this weekend.       Pain: 1/10     Objective               Assessment  completed todays treatment without c/o increased pain or discomfort following treatment.  continues to have good LP stability with core excercises today. able to transition KTC stretches in spine without LBP indicating improved lumbar stability as compared to eval. functional mobility is improving with a lot of standing, walking and sitting over the weekend without c/o increased LBP.    Goals (to be met in 10 visits)      Not Met Progress Toward Partially Met Met   Pt will improve transversus abdominis recruitment to perform proper isometric contraction without requiring verbal or tactile cuing to promote advancement of therex. [] [x] [] []   Pt will demonstrate good understanding of proper posture and body mechanics to decrease pain and improve spinal safety. [] [x] [] []   Pt will improve lumbar spine AROM flexion to >80 deg to allow increase ease with bending forward to don shoes. [] [x] [] []   Pt will report improved symptom centralization and absence of radicular symptoms for 3 consecutive days to improve function with ADLs. [] [x] [] []   Pt will have decreased paraspinal mm tension to tolerate  standing >30 minutes for work and home activities. [] [x] [] []   Pt will demonstrate improved core strength to be able to perform bending  with <2/10 pain. [] [x] [] []   Pt will be independent and compliant with comprehensive HEP to maintain progress achieved in PT [] [x] [] []                                      Plan  continue POC. Progress as tolerated.    Treatment Last 4 Visits  Treatment Day: 8 5/28/2025 6/5/2025 6/9/2025 6/23/2025   Spine Treatment   Therapeutic Exercise Nu step L3 x6 min  Standing lumbar extension x10  RTB lateral and monster band walking with band around knees 35ft x 2 ea  Prone press ups x10  Hkly: LTR x10 ea  SKTC R/L 5x10 sec  Seated piriformis stretch figure 4 3x 5-10 sec  Seated lumbar flexion 5 x10 sec   Nu step L3 x6 min  Standing lumbar extension x10  GTB lateral and monster band walking with band around knees 35ft x 2 ea  Prone press ups x10  Hkly: LTR x10 ea  SKTC R/L 5x10 sec  Seated piriformis stretch figure 4 3x 5-10 sec  Seated lumbar flexion 5 x10 sec   Nu step L3 x6 min  Standing lumbar extension x10  GTB lateral and monster band walking with band around knees 35ft x 2 ea  Prone press ups x10  Hkly: LTR x10 ea  SKTC R/L 5x10 sec  Seated piriformis stretch figure 4 3x 5-10 sec  Seated lumbar flexion 5 x10 sec   Upright bike L1 x5 min S#6  Standing lumbar extension x10  GTB lateral and monster band walking with band around knees 35ft x 2 ea  Prone press ups x10  Hkly: LTR x10 ea  SKTC R/L 5x10 sec  Seated piriformis stretch figure 4 3x 5-10 sec  Seated lumbar flexion 5 x10 sec     Neuro Re-Education Reformer 5 bands double knee ext with TrA 3x20 reps  Prone on bed: alt hip ext R/L x10 ea  Prone alt UE/LE lift x10 ea  Hkly: pelvic tilt 10x10 sec  Hkly: TrA bilat hip abd GTB 2x10 reps  Hkly: TrA with marches x10 ea  Hkly: dead bug x10  Hkly: bridges with LE on YSB 10x 5 sec   Reformer 5 bands double knee ext with TrA 3x20 reps  Prone on bed: alt hip ext R/L x10 ea  Prone  alt UE/LE lift x10 ea  Hkly: pelvic tilt 10x10 sec  Hkly: TrA bilat hip abd GTB 3x10 reps  Hkly: TrA with marches with GTB x10 ea  Hkly: dead bug with GTB x10  Hkly: bridges with LE on YSB 10x 5 sec   Reformer 5 bands double knee ext with TrA 3x20 reps  Prone on bed: alt hip ext R/L x10 ea  Prone alt UE/LE lift x10 ea  Hkly: pelvic tilt 10x10 sec  Hkly: TrA bilat hip abd GTB 3x10 reps  Hkly: TrA with marches with GTB x10 ea  Hkly: dead bug with GTB x10  Hkly: bridges with GTB 10x 5 sec   Reformer 5 bands double knee ext with TrA 3x20 reps  Prone on bed: alt hip ext R/L x10 ea  Prone alt UE/LE lift x10 ea  Hkly: pelvic tilt 10x10 sec  Hkly: TrA bilat hip abd GTB 3x10 reps  Hkly: TrA with marches with GTB x10 ea  Hkly: dead bug with GTB x10  Hkly: bridges with Green SB 10x 5 sec     Therapeutic Exercise Minutes 20 20 20 20   Neuro Re-Educ Minutes 25 25 25 25   Total Time Of Timed Procedures 45 45 45 45   Total Time Of Service-Based Procedures 0 0 0 0   Total Treatment Time 45 45 45 45   HEP Access Code: [email or web address]  URL: [email or web address]  Date: 05/28/2025  Prepared by: Susan Mcmahan    Exercises  - Supine Posterior Pelvic Tilt  - 3 x daily - 7 x weekly - 1 sets - 10 reps - 10 sec hold  - Supine Lower Trunk Rotation  - 3 x daily - 7 x weekly - 1 sets - 10 reps - 3 sec hold  - Supine Single Knee to Chest Stretch  - 3 x daily - 7 x weekly - 1 sets - 3 reps - 10 sec hold  - Hooklying Clamshell with Resistance  - 3 x daily - 7 x weekly - 2 sets - 10 reps - 3 sec hold  - Clamshell  - 3 x daily - 7 x weekly - 1 sets - 10 reps - 3 sec hold  - Side Stepping with Resistance at Ankles  - 1-2 x daily - 7 x weekly  - Band Walks  - 1-2 x daily - 7 x weekly  - Prone Alternating Arm and Leg Lifts  - 1-2 x daily - 7 x weekly - 1 sets - 10 reps - 2 sec hold  - Dead Bug  - 1-2 x daily - 7 x weekly - 1 sets - 10 reps - 2-3 sec hold hold  - Seated Piriformis Stretch with Trunk Bend  - 1-2 x daily - 7 x weekly - 1 sets -  3 reps - 5-10 sec hold  - Seated Lumbar Flexion Stretch  - 1-2 x daily - 7 x weekly - 1 sets - 5 reps - 10 sec hold  - Standing Lumbar Extension  - 2-3 x daily - 7 x weekly - 1 sets - 10 reps - 2 sec hold  - Prone Press Up  - 1-2 x daily - 7 x weekly - 1 sets - 10 reps - 2 sec hold           HEP  Access Code: [email or web address]  URL: [email or web address]  Date: 05/28/2025  Prepared by: Susan Mcmahan    Exercises  - Supine Posterior Pelvic Tilt  - 3 x daily - 7 x weekly - 1 sets - 10 reps - 10 sec hold  - Supine Lower Trunk Rotation  - 3 x daily - 7 x weekly - 1 sets - 10 reps - 3 sec hold  - Supine Single Knee to Chest Stretch  - 3 x daily - 7 x weekly - 1 sets - 3 reps - 10 sec hold  - Hooklying Clamshell with Resistance  - 3 x daily - 7 x weekly - 2 sets - 10 reps - 3 sec hold  - Clamshell  - 3 x daily - 7 x weekly - 1 sets - 10 reps - 3 sec hold  - Side Stepping with Resistance at Ankles  - 1-2 x daily - 7 x weekly  - Band Walks  - 1-2 x daily - 7 x weekly  - Prone Alternating Arm and Leg Lifts  - 1-2 x daily - 7 x weekly - 1 sets - 10 reps - 2 sec hold  - Dead Bug  - 1-2 x daily - 7 x weekly - 1 sets - 10 reps - 2-3 sec hold hold  - Seated Piriformis Stretch with Trunk Bend  - 1-2 x daily - 7 x weekly - 1 sets - 3 reps - 5-10 sec hold  - Seated Lumbar Flexion Stretch  - 1-2 x daily - 7 x weekly - 1 sets - 5 reps - 10 sec hold  - Standing Lumbar Extension  - 2-3 x daily - 7 x weekly - 1 sets - 10 reps - 2 sec hold  - Prone Press Up  - 1-2 x daily - 7 x weekly - 1 sets - 10 reps - 2 sec hold    Charges  Ex 1, Nreed 2

## 2025-07-02 ENCOUNTER — OFFICE VISIT (OUTPATIENT)
Dept: PHYSICAL THERAPY | Age: 70
End: 2025-07-02
Attending: FAMILY MEDICINE
Payer: MEDICARE

## 2025-07-02 PROCEDURE — 97110 THERAPEUTIC EXERCISES: CPT

## 2025-07-02 PROCEDURE — 97112 NEUROMUSCULAR REEDUCATION: CPT

## 2025-07-10 ENCOUNTER — OFFICE VISIT (OUTPATIENT)
Dept: PHYSICAL THERAPY | Age: 70
End: 2025-07-10
Attending: FAMILY MEDICINE
Payer: MEDICARE

## 2025-07-10 PROCEDURE — 97112 NEUROMUSCULAR REEDUCATION: CPT

## 2025-07-10 PROCEDURE — 97110 THERAPEUTIC EXERCISES: CPT

## 2025-07-10 NOTE — PROGRESS NOTES
Patient: Aliya Peraza (69 year old, female) Referring Provider:  Insurance:   Diagnosis: Chronic right-sided low back pain with right-sided sciatica (M54.41,G89.29); rehab Dx: lumbar instability No ref. provider found  BLUE CROSS MCR   Date of Surgery: No data recorded Next MD visit:  N/A   Precautions:  None No data recorded Referral Information:    Date of Evaluation: Req: 0, Auth: 0, Exp:     No data recorded POC Auth Visits:  10       Today's Date   7/10/2025   Discharge Summary  Pt has attended 10 visits in Physical Therapy.     Subjective  Feels 90% improvement with her pain and function.       Pain: 0/10     Objective          Trunk       4/28/2025 7/10/2025   Trunk ROM/MMT   Trunk Flex 70 deg* 80   Trunk Extension 30 deg 30   Trunk Rt Side Bend 15 deg* 15   Trunk Lt Side Bend 15 deg 15   Trunk Rt Rotation 75% 100%   Trunk Lt Rotation 75% 100%   , Hip       4/28/2025 7/10/2025   Hip ROM/MMT   Rt Hip Flexion MMT (L2) 5    Lt Hip Flexion MMT (L2) 5    Rt Hip Abduction MMT 4 4+   Lt Hip Abduction MMT 4 4+   Rt Hip ER MMT 4 4+   Lt Hip ER MMT 4 4+   Rt Hip IR MMT 5    Lt Hip IR MMT 5         Assessment   Has made excellent improvements with ROM, strength and function. Demonstrates independence with HEP and will discharge from PT to a HEP at this time with most goals met.     Goals (to be met in 10 visits)      Not Met Progress Toward Partially Met Met   Pt will improve transversus abdominis recruitment to perform proper isometric contraction without requiring verbal or tactile cuing to promote advancement of therex. [] [] [] [x]   Pt will demonstrate good understanding of proper posture and body mechanics to decrease pain and improve spinal safety. [] [] [] [x]   Pt will improve lumbar spine AROM flexion to >80 deg to allow increase ease with bending forward to don shoes. [] [x] [] []   Pt will report improved symptom centralization and absence of radicular symptoms for 3 consecutive days to improve  function with ADLs. [] [] [] [x]   Pt will have decreased paraspinal mm tension to tolerate standing >30 minutes for work and home activities. [] [] [x] []   Pt will demonstrate improved core strength to be able to perform bending  with <2/10 pain. [] [] [] [x]   Pt will be independent and compliant with comprehensive HEP to maintain progress achieved in PT [] [x] [] []                   Oswestry Disability Index Score  Score: (Patient-Rptd) 36 % (4/28/2025 10:51 AM)    Post Oswestry Disability Index Score  Post Score: 12 % (7/10/2025 10:38 AM)    24 % improvement    Plan: Recommend to discharge patient from PT to HEP.         Patient/Family/Caregiver was advised of these findings, precautions, and treatment options and has agreed to actively participate in planning and for this course of care.    Thank you for your referral. If you have any questions, please contact me at Dept: [phone].    Sincerely,  Electronically signed by therapist: Susan Mcmahan, PT     Physician's certification required:  Yes  Please co-sign or sign and return this letter via fax as soon as possible to [phone].   I certify the need for these services furnished under this plan of treatment and while under my care.    X___________________________________________________ Date____________________    Certification From: 7/10/2025  To:10/8/2025                              Plan       Treatment Last 4 Visits  Treatment Day: 10       6/9/2025 6/23/2025 7/2/2025 7/10/2025   Spine Treatment   Therapeutic Exercise Nu step L3 x6 min  Standing lumbar extension x10  GTB lateral and monster band walking with band around knees 35ft x 2 ea  Prone press ups x10  Hkly: LTR x10 ea  SKTC R/L 5x10 sec  Seated piriformis stretch figure 4 3x 5-10 sec  Seated lumbar flexion 5 x10 sec   Upright bike L1 x5 min S#6  Standing lumbar extension x10  GTB lateral and monster band walking with band around knees 35ft x 2 ea  Prone press ups x10  Hkly: LTR x10  ea  SKTC R/L 5x10 sec  Seated piriformis stretch figure 4 3x 5-10 sec  Seated lumbar flexion 5 x10 sec   Nu step L3 x6 min  Standing lumbar extension x10  GTB lateral and monster band walking with band around knees 35ft x 2 ea  Prone press ups x10  Hkly: LTR x10 ea  SKTC R/L 5x10 sec  Seated piriformis stretch figure 4 3x 5-10 sec  Seated lumbar flexion 5 x10 sec   Nu step L3 x6 min  Standing lumbar extension x10  GTB lateral and monster band walking with band around knees 35ft x 2 ea  Seated piriformis stretch figure 4 3x 5-10 sec  Seated lumbar flexion 5 x10 sec  Re-assess   Neuro Re-Education Reformer 5 bands double knee ext with TrA 3x20 reps  Prone on bed: alt hip ext R/L x10 ea  Prone alt UE/LE lift x10 ea  Hkly: pelvic tilt 10x10 sec  Hkly: TrA bilat hip abd GTB 3x10 reps  Hkly: TrA with marches with GTB x10 ea  Hkly: dead bug with GTB x10  Hkly: bridges with GTB 10x 5 sec   Reformer 5 bands double knee ext with TrA 3x20 reps  Prone on bed: alt hip ext R/L x10 ea  Prone alt UE/LE lift x10 ea  Hkly: pelvic tilt 10x10 sec  Hkly: TrA bilat hip abd GTB 3x10 reps  Hkly: TrA with marches with GTB x10 ea  Hkly: dead bug with GTB x10  Hkly: bridges with Green SB 10x 5 sec   Reformer 5 bands double knee ext with TrA 3x20 reps  Prone on bed: alt hip ext R/L x10 ea  Prone alt UE/LE lift x10 ea  Hkly: pelvic tilt 10x10 sec  Hkly: TrA bilat hip abd GTB 3x10 reps  Hkly: TrA with marches with GTB x10 ea  Hkly: dead bug with GTB x10  Hkly: bridges with Green SB 10x 5 sec   Reformer 6 bands double knee ext with TrA 3x20 reps  Hkly: pelvic tilt 10x10 sec  Hkly: TrA bilat hip abd GTB 2x10 reps  Hkly: TrA with marches with GTB x10 ea  Hkly: dead bug with GTB x10  Hkly: bridges with Green SB 10x 5 sec     Therapeutic Exercise Minutes 20 20 20 25   Neuro Re-Educ Minutes 25 25 25 20   Total Time Of Timed Procedures 45 45 45 45   Total Time Of Service-Based Procedures 0 0 0 0   Total Treatment Time 45 45 45 45        HEP  Access  Code: [email or web address]  URL: [email or web address]  Date: 05/28/2025  Prepared by: Susan Mcmahan    Exercises  - Supine Posterior Pelvic Tilt  - 3 x daily - 7 x weekly - 1 sets - 10 reps - 10 sec hold  - Supine Lower Trunk Rotation  - 3 x daily - 7 x weekly - 1 sets - 10 reps - 3 sec hold  - Supine Single Knee to Chest Stretch  - 3 x daily - 7 x weekly - 1 sets - 3 reps - 10 sec hold  - Hooklying Clamshell with Resistance  - 3 x daily - 7 x weekly - 2 sets - 10 reps - 3 sec hold  - Clamshell  - 3 x daily - 7 x weekly - 1 sets - 10 reps - 3 sec hold  - Side Stepping with Resistance at Ankles  - 1-2 x daily - 7 x weekly  - Band Walks  - 1-2 x daily - 7 x weekly  - Prone Alternating Arm and Leg Lifts  - 1-2 x daily - 7 x weekly - 1 sets - 10 reps - 2 sec hold  - Dead Bug  - 1-2 x daily - 7 x weekly - 1 sets - 10 reps - 2-3 sec hold hold  - Seated Piriformis Stretch with Trunk Bend  - 1-2 x daily - 7 x weekly - 1 sets - 3 reps - 5-10 sec hold  - Seated Lumbar Flexion Stretch  - 1-2 x daily - 7 x weekly - 1 sets - 5 reps - 10 sec hold  - Standing Lumbar Extension  - 2-3 x daily - 7 x weekly - 1 sets - 10 reps - 2 sec hold  - Prone Press Up  - 1-2 x daily - 7 x weekly - 1 sets - 10 reps - 2 sec hold    Charges  Ex 2, NReed 1

## (undated) DEVICE — POOLE SUCTION HANDLE: Brand: CARDINAL HEALTH

## (undated) DEVICE — LAPAROTOMY CDS: Brand: MEDLINE INDUSTRIES, INC.

## (undated) DEVICE — PROXIMATE RELOADABLE LINEAR CUTTER WITH SAFETY LOCK-OUT, 75MM: Brand: PROXIMATE

## (undated) DEVICE — TOWEL SURG OR 17X30IN BLUE

## (undated) DEVICE — SUTURE PDS II 1 TP-1

## (undated) DEVICE — DRESSING AQUACEL AG 3.5 X 10

## (undated) DEVICE — PROXIMATE RH ROTATING HEAD SKIN STAPLERS (35 WIDE) CONTAINS 35 STAINLESS STEEL STAPLES: Brand: PROXIMATE

## (undated) DEVICE — SUTURE VICRYL 0

## (undated) DEVICE — GOWN,SIRUS,FABRIC-REINFORCED,X-LARGE: Brand: MEDLINE

## (undated) DEVICE — SCD SLEEVE KNEE HI BLEND

## (undated) DEVICE — SUTURE VICRYL 2-0

## (undated) DEVICE — CONTOUR CURVED CUTTER STAPLER: Brand: CONTOUR

## (undated) DEVICE — LAPAROTOMY SPONGE - RF AND X-RAY DETECTABLE PRE-WASHED: Brand: SITUATE

## (undated) DEVICE — SOL  .9 1000ML BTL

## (undated) DEVICE — SUTURE PROLENE 0 CT-2

## (undated) DEVICE — 3M™ TEGADERM™ TRANSPARENT FILM DRESSING, 1626W, 4 IN X 4-3/4 IN (10 CM X 12 CM), 50 EACH/CARTON, 4 CARTON/CASE: Brand: 3M™ TEGADERM™

## (undated) DEVICE — SUTURE ETHILON 2-0 FS

## (undated) DEVICE — VIOLET BRAIDED (POLYGLACTIN 910), SYNTHETIC ABSORBABLE SUTURE: Brand: COATED VICRYL

## (undated) DEVICE — STERILE POLYISOPRENE POWDER-FREE SURGICAL GLOVES: Brand: PROTEXIS

## (undated) DEVICE — DRAIN CHANNEL 19FR BLAKE

## (undated) DEVICE — LIGHT HANDLE

## (undated) DEVICE — LIGASURE IMPACT OPEN DEVICE

## (undated) DEVICE — HEX-LOCKING BLADE ELECTRODE: Brand: EDGE

## (undated) DEVICE — GAUZE SPONGES,USP TYPE VII GAUZE, 12 PLY: Brand: CURITY

## (undated) DEVICE — CHLORAPREP 26ML APPLICATOR

## (undated) DEVICE — DRAIN RESERVOIR RELIAVAC 100CC

## (undated) NOTE — LETTER
02/28/19        [address] [place] [id number]      Dear Marcia Bates,    [facility] records indicate that you have outstanding lab work and or testing that was ordered for you and has not yet been completed:  Orders Placed This Encounte

## (undated) NOTE — LETTER
[address]  [address], [address] [facility]  Authorization for Surgical Operation and Procedure     Date:___________                                                                                                         Time:__________ can occur: fever and allergic reactions, hemolytic reactions, transmission of diseases such as Hepatitis, AIDS and Cytomegalovirus (CMV) and fluid overload.   In the event that I wish to have an autologous transfusion of my own blood, or a directed donor tr the applicable recovery period ends for purposes of reinstating the DNAR order.   10. Patients having a sterilization procedure: I understand that if the procedure is successful the results will be permanent and it will therefore be impossible for me to ins doctor) to give me medicine and do additional procedures as necessary.  Some examples are: Starting or using an “IV” to give me medicine, fluids or blood during my procedure, and having a breathing tube placed to help me breathe when I’m asleep (intubation) understand that rare but potential complications include headache, bleeding, infection, seizure, irregular heart rhythms, and nerve injury.     I can change my mind about having anesthesia services at any time before I get the medicine.    _________________

## (undated) NOTE — Clinical Note
I had the pleasure of seeing Brionna Cruz on 8/4/2021. Please see my attached note.     Teresa Aguirre MD FACS  [facility]

## (undated) NOTE — LETTER
07[facility]27/20        [facility] [address]      Dear Daniel Flores,    [facility] records indicate that you have outstanding lab work and or testing that was ordered for you and has not yet been completed:  Orders Placed This En

## (undated) NOTE — MR AVS SNAPSHOT
[address]  [address] [facility], [address] [address] [phone]               Thank you for choosing us for your health care visit with Michelle Segura DO.   We are glad to serve you and happy to provide you with this 118/68 mmHg 78 97.8 °F (36.6 °C) (Oral) 68.5\" 194 lb 29.07 kg/m2         Current Medications          This list is accurate as of: 3/13/17  5:29 PM.  Always use your most recent med list.                Fiber Chew   Chew  by mouth.            MULTIVITAMIN Tips for increasing your physical activity – Adults who are physically active are less likely to develop some chronic diseases than adults who are inactive.      HOW TO GET STARTED: HOW TO STAY MOTIVATED:   Start activities slowly and build up over time Do